# Patient Record
Sex: FEMALE | Race: BLACK OR AFRICAN AMERICAN | Employment: PART TIME | URBAN - METROPOLITAN AREA
[De-identification: names, ages, dates, MRNs, and addresses within clinical notes are randomized per-mention and may not be internally consistent; named-entity substitution may affect disease eponyms.]

---

## 2019-05-20 PROBLEM — F32.A DEPRESSIVE DISORDER: Status: ACTIVE | Noted: 2019-05-20

## 2019-05-21 PROBLEM — F31.64 BIPOLAR AFFECTIVE DISORDER, MIXED, SEVERE, WITH PSYCHOTIC BEHAVIOR (HCC): Status: ACTIVE | Noted: 2019-05-20

## 2019-05-30 PROBLEM — F31.2 SEVERE MANIC BIPOLAR I DISORDER WITH PSYCHOTIC FEATURES (HCC): Status: ACTIVE | Noted: 2019-05-20

## 2020-07-31 ENCOUNTER — OFFICE VISIT (OUTPATIENT)
Dept: NEUROLOGY | Age: 30
End: 2020-07-31

## 2020-07-31 VITALS
BODY MASS INDEX: 39.88 KG/M2 | DIASTOLIC BLOOD PRESSURE: 84 MMHG | HEART RATE: 84 BPM | WEIGHT: 190 LBS | SYSTOLIC BLOOD PRESSURE: 120 MMHG | RESPIRATION RATE: 18 BRPM | HEIGHT: 58 IN | OXYGEN SATURATION: 98 % | TEMPERATURE: 97.6 F

## 2020-07-31 DIAGNOSIS — R56.9 SEIZURES (HCC): Primary | ICD-10-CM

## 2020-07-31 NOTE — PATIENT INSTRUCTIONS
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
## 2020-08-14 ENCOUNTER — HOSPITAL ENCOUNTER (OUTPATIENT)
Dept: MRI IMAGING | Age: 30
Discharge: HOME | End: 2020-08-14
Attending: PSYCHIATRY & NEUROLOGY
Payer: COMMERCIAL

## 2020-08-14 ENCOUNTER — HOSPITAL ENCOUNTER (OUTPATIENT)
Dept: NEUROLOGY | Age: 30
Discharge: HOME | End: 2020-08-14
Attending: PSYCHIATRY & NEUROLOGY
Payer: COMMERCIAL

## 2020-08-14 DIAGNOSIS — R56.9 SEIZURES (HCC): ICD-10-CM

## 2020-08-14 PROCEDURE — 74011250636 HC RX REV CODE- 250/636: Performed by: PSYCHIATRY & NEUROLOGY

## 2020-08-14 PROCEDURE — 95816 EEG AWAKE AND DROWSY: CPT

## 2020-08-14 PROCEDURE — A9575 INJ GADOTERATE MEGLUMI 0.1ML: HCPCS | Performed by: PSYCHIATRY & NEUROLOGY

## 2020-08-14 PROCEDURE — 70553 MRI BRAIN STEM W/O & W/DYE: CPT

## 2020-08-14 RX ORDER — GADOTERATE MEGLUMINE 376.9 MG/ML
18 INJECTION INTRAVENOUS
Status: COMPLETED | OUTPATIENT
Start: 2020-08-14 | End: 2020-08-14

## 2020-08-14 RX ADMIN — GADOTERATE MEGLUMINE 18 ML: 376.9 INJECTION INTRAVENOUS at 12:09

## 2020-08-26 NOTE — PROGRESS NOTES
MRI of the brain showed no evidence of a stroke, tumor or bleed. It also did not show any evidence of abnormalities that may cause a seizure. Can you please let patient know.

## 2020-11-10 ENCOUNTER — OFFICE VISIT (OUTPATIENT)
Dept: NEUROLOGY | Age: 30
End: 2020-11-10
Payer: COMMERCIAL

## 2020-11-10 VITALS
SYSTOLIC BLOOD PRESSURE: 120 MMHG | WEIGHT: 205 LBS | TEMPERATURE: 97.7 F | DIASTOLIC BLOOD PRESSURE: 84 MMHG | OXYGEN SATURATION: 98 % | RESPIRATION RATE: 18 BRPM | BODY MASS INDEX: 43.03 KG/M2 | HEIGHT: 58 IN | HEART RATE: 76 BPM

## 2020-11-10 DIAGNOSIS — R68.89 SPELLS OF DECREASED ATTENTIVENESS: Primary | ICD-10-CM

## 2020-11-10 PROCEDURE — 99214 OFFICE O/P EST MOD 30 MIN: CPT | Performed by: PSYCHIATRY & NEUROLOGY

## 2020-11-10 RX ORDER — MELATONIN
1000 DAILY
COMMUNITY
Start: 2020-09-24 | End: 2021-07-29
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
## 2020-11-10 NOTE — PATIENT INSTRUCTIONS

## 2020-12-15 ENCOUNTER — TELEPHONE (OUTPATIENT)
Dept: NEUROLOGY | Age: 30
End: 2020-12-15

## 2020-12-15 NOTE — TELEPHONE ENCOUNTER
----- Message from [facility] sent at 12/14/2020 12:11 PM EST -----  Regarding: Dr. Leela Ding Message/Vendor Calls    Caller's first and last name:  N/A      Reason for call:  Medical records      Callback required yes/no and why: Y      Best contact number(s):  [phone]      Details to clarify the request:  Patient is requesting that her medical records be faxed over to [facility]. Patient signed a medical release form previously. She doesn't have the fax number for the counseling center, but she is asking that the office call and get the fax number. They can be reached at [phone]. Patient is requesting a call back to confirm that this has been done.       [facility]

## 2020-12-21 ENCOUNTER — TELEPHONE (OUTPATIENT)
Dept: NEUROLOGY | Age: 30
End: 2020-12-21

## 2020-12-21 NOTE — TELEPHONE ENCOUNTER
----- Message from Yahir Higgins sent at 12/18/2020  3:58 PM EST -----  Regarding: Dr Carolyn Frias first and last name:      Reason for call:pt said she is calling back to provide a fax number for the [address] for paper work for proof that she does not have seizures it is (p) [phone] attn:Nurse Nicki Frey required yes/no and why:yes to confirm it was faxed       Best contact number(s):[phone]      Details to clarify the request:      Yahir Higgins

## 2021-07-23 ENCOUNTER — HOSPITAL ENCOUNTER (INPATIENT)
Age: 31
LOS: 6 days | Discharge: HOME | DRG: 753 | End: 2021-07-29
Attending: EMERGENCY MEDICINE | Admitting: PSYCHIATRY & NEUROLOGY
Payer: COMMERCIAL

## 2021-07-23 DIAGNOSIS — F23 ACUTE PSYCHOSIS (HCC): Primary | ICD-10-CM

## 2021-07-23 PROBLEM — F29 PSYCHOSIS (HCC): Status: ACTIVE | Noted: 2021-07-23

## 2021-07-23 LAB
ALBUMIN SERPL-MCNC: 3.6 G/DL (ref 3.5–5)
ALBUMIN/GLOB SERPL: 0.8 {RATIO} (ref 1.1–2.2)
ALP SERPL-CCNC: 105 U/L (ref 45–117)
ALT SERPL-CCNC: 29 U/L (ref 12–78)
AMPHET UR QL SCN: NEGATIVE
ANION GAP SERPL CALC-SCNC: 10 MMOL/L (ref 5–15)
APAP SERPL-MCNC: <2 UG/ML (ref 10–30)
APPEARANCE UR: CLEAR
AST SERPL-CCNC: 22 U/L (ref 15–37)
BACTERIA URNS QL MICRO: ABNORMAL /HPF
BARBITURATES UR QL SCN: NEGATIVE
BASOPHILS # BLD: 0 K/UL (ref 0–0.1)
BASOPHILS NFR BLD: 1 % (ref 0–1)
BENZODIAZ UR QL: NEGATIVE
BILIRUB SERPL-MCNC: 0.3 MG/DL (ref 0.2–1)
BILIRUB UR QL: NEGATIVE
BUN SERPL-MCNC: 7 MG/DL (ref 6–20)
BUN/CREAT SERPL: 9 (ref 12–20)
CALCIUM SERPL-MCNC: 8.8 MG/DL (ref 8.5–10.1)
CANNABINOIDS UR QL SCN: NEGATIVE
CHLORIDE SERPL-SCNC: 101 MMOL/L (ref 97–108)
CO2 SERPL-SCNC: 28 MMOL/L (ref 21–32)
COCAINE UR QL SCN: NEGATIVE
COLOR UR: ABNORMAL
CREAT SERPL-MCNC: 0.8 MG/DL (ref 0.55–1.02)
DIFFERENTIAL METHOD BLD: ABNORMAL
DRUG SCRN COMMENT,DRGCM: NORMAL
EOSINOPHIL # BLD: 0 K/UL (ref 0–0.4)
EOSINOPHIL NFR BLD: 1 % (ref 0–7)
EPITH CASTS URNS QL MICRO: ABNORMAL /LPF
ERYTHROCYTE [DISTWIDTH] IN BLOOD BY AUTOMATED COUNT: 13.4 % (ref 11.5–14.5)
EST. AVERAGE GLUCOSE BLD GHB EST-MCNC: 146 MG/DL
ETHANOL SERPL-MCNC: <10 MG/DL
FLUAV RNA SPEC QL NAA+PROBE: NOT DETECTED
FLUBV RNA SPEC QL NAA+PROBE: NOT DETECTED
GLOBULIN SER CALC-MCNC: 4.6 G/DL (ref 2–4)
GLUCOSE BLD STRIP.AUTO-MCNC: 130 MG/DL (ref 65–117)
GLUCOSE SERPL-MCNC: 98 MG/DL (ref 65–100)
GLUCOSE UR STRIP.AUTO-MCNC: NEGATIVE MG/DL
HBA1C MFR BLD: 6.7 % (ref 4–5.6)
HCG UR QL: NEGATIVE
HCT VFR BLD AUTO: 40.5 % (ref 35–47)
HGB BLD-MCNC: 12.9 G/DL (ref 11.5–16)
HGB UR QL STRIP: NEGATIVE
IMM GRANULOCYTES # BLD AUTO: 0 K/UL (ref 0–0.04)
IMM GRANULOCYTES NFR BLD AUTO: 0 % (ref 0–0.5)
KETONES UR QL STRIP.AUTO: 15 MG/DL
LEUKOCYTE ESTERASE UR QL STRIP.AUTO: NEGATIVE
LYMPHOCYTES # BLD: 1.3 K/UL (ref 0.8–3.5)
LYMPHOCYTES NFR BLD: 24 % (ref 12–49)
MCH RBC QN AUTO: 25.4 PG (ref 26–34)
MCHC RBC AUTO-ENTMCNC: 31.9 G/DL (ref 30–36.5)
MCV RBC AUTO: 79.7 FL (ref 80–99)
METHADONE UR QL: NEGATIVE
MONOCYTES # BLD: 0.4 K/UL (ref 0–1)
MONOCYTES NFR BLD: 8 % (ref 5–13)
NEUTS SEG # BLD: 3.9 K/UL (ref 1.8–8)
NEUTS SEG NFR BLD: 66 % (ref 32–75)
NITRITE UR QL STRIP.AUTO: NEGATIVE
NRBC # BLD: 0 K/UL (ref 0–0.01)
NRBC BLD-RTO: 0 PER 100 WBC
OPIATES UR QL: NEGATIVE
PCP UR QL: NEGATIVE
PH UR STRIP: 6.5 [PH] (ref 5–8)
PLATELET # BLD AUTO: 332 K/UL (ref 150–400)
PMV BLD AUTO: 9.4 FL (ref 8.9–12.9)
POTASSIUM SERPL-SCNC: 3.7 MMOL/L (ref 3.5–5.1)
PROT SERPL-MCNC: 8.2 G/DL (ref 6.4–8.2)
PROT UR STRIP-MCNC: NEGATIVE MG/DL
RBC # BLD AUTO: 5.08 M/UL (ref 3.8–5.2)
RBC #/AREA URNS HPF: ABNORMAL /HPF (ref 0–5)
SALICYLATES SERPL-MCNC: <1.7 MG/DL (ref 2.8–20)
SARS-COV-2, COV2: NOT DETECTED
SERVICE CMNT-IMP: ABNORMAL
SODIUM SERPL-SCNC: 139 MMOL/L (ref 136–145)
SP GR UR REFRACTOMETRY: 1.02 (ref 1–1.03)
TSH SERPL DL<=0.05 MIU/L-ACNC: 1.82 UIU/ML (ref 0.36–3.74)
UROBILINOGEN UR QL STRIP.AUTO: 1 EU/DL (ref 0.2–1)
WBC # BLD AUTO: 5.7 K/UL (ref 3.6–11)
WBC URNS QL MICRO: ABNORMAL /HPF (ref 0–4)

## 2021-07-23 PROCEDURE — 36415 COLL VENOUS BLD VENIPUNCTURE: CPT

## 2021-07-23 PROCEDURE — 74011250637 HC RX REV CODE- 250/637: Performed by: PSYCHIATRY & NEUROLOGY

## 2021-07-23 PROCEDURE — 85025 COMPLETE CBC W/AUTO DIFF WBC: CPT

## 2021-07-23 PROCEDURE — 99284 EMERGENCY DEPT VISIT MOD MDM: CPT

## 2021-07-23 PROCEDURE — 83036 HEMOGLOBIN GLYCOSYLATED A1C: CPT

## 2021-07-23 PROCEDURE — 80179 DRUG ASSAY SALICYLATE: CPT

## 2021-07-23 PROCEDURE — 81001 URINALYSIS AUTO W/SCOPE: CPT

## 2021-07-23 PROCEDURE — 80053 COMPREHEN METABOLIC PANEL: CPT

## 2021-07-23 PROCEDURE — 82077 ASSAY SPEC XCP UR&BREATH IA: CPT

## 2021-07-23 PROCEDURE — 87636 SARSCOV2 & INF A&B AMP PRB: CPT

## 2021-07-23 PROCEDURE — 82962 GLUCOSE BLOOD TEST: CPT

## 2021-07-23 PROCEDURE — 80143 DRUG ASSAY ACETAMINOPHEN: CPT

## 2021-07-23 PROCEDURE — 84443 ASSAY THYROID STIM HORMONE: CPT

## 2021-07-23 PROCEDURE — 81025 URINE PREGNANCY TEST: CPT

## 2021-07-23 PROCEDURE — 80307 DRUG TEST PRSMV CHEM ANLYZR: CPT

## 2021-07-23 PROCEDURE — 65220000003 HC RM SEMIPRIVATE PSYCH

## 2021-07-23 RX ORDER — MAG HYDROX/ALUMINUM HYD/SIMETH 200-200-20
30 SUSPENSION, ORAL (FINAL DOSE FORM) ORAL
Status: DISCONTINUED | OUTPATIENT
Start: 2021-07-23 | End: 2021-07-29 | Stop reason: HOSPADM

## 2021-07-23 RX ORDER — LORAZEPAM 1 MG/1
1 TABLET ORAL
Status: DISCONTINUED | OUTPATIENT
Start: 2021-07-23 | End: 2021-07-29 | Stop reason: HOSPADM

## 2021-07-23 RX ORDER — BUPROPION HYDROCHLORIDE 100 MG/1
TABLET, EXTENDED RELEASE ORAL
COMMUNITY
Start: 2021-06-08 | End: 2021-07-29

## 2021-07-23 RX ORDER — ACETAMINOPHEN 325 MG/1
650 TABLET ORAL
Status: DISCONTINUED | OUTPATIENT
Start: 2021-07-23 | End: 2021-07-29 | Stop reason: HOSPADM

## 2021-07-23 RX ORDER — ADHESIVE BANDAGE
30 BANDAGE TOPICAL DAILY PRN
Status: DISCONTINUED | OUTPATIENT
Start: 2021-07-23 | End: 2021-07-29 | Stop reason: HOSPADM

## 2021-07-23 RX ORDER — ALBUTEROL SULFATE 90 UG/1
2 AEROSOL, METERED RESPIRATORY (INHALATION)
COMMUNITY
Start: 2020-09-23

## 2021-07-23 RX ORDER — HYDROXYZINE PAMOATE 50 MG/1
50 CAPSULE ORAL
Status: DISCONTINUED | OUTPATIENT
Start: 2021-07-23 | End: 2021-07-29 | Stop reason: HOSPADM

## 2021-07-23 RX ORDER — LORAZEPAM 2 MG/ML
1 INJECTION INTRAMUSCULAR
Status: DISCONTINUED | OUTPATIENT
Start: 2021-07-23 | End: 2021-07-29 | Stop reason: HOSPADM

## 2021-07-23 RX ORDER — TRAZODONE HYDROCHLORIDE 50 MG/1
50 TABLET ORAL
Status: DISCONTINUED | OUTPATIENT
Start: 2021-07-23 | End: 2021-07-29 | Stop reason: HOSPADM

## 2021-07-23 RX ORDER — IBUPROFEN 400 MG/1
400 TABLET ORAL
Status: DISCONTINUED | OUTPATIENT
Start: 2021-07-23 | End: 2021-07-29 | Stop reason: HOSPADM

## 2021-07-23 RX ADMIN — QUETIAPINE FUMARATE 300 MG: 200 TABLET ORAL at 22:32

## 2021-07-23 NOTE — ED NOTES
GHAZALAT consult with Amara Logan at [facility] completed via televideo. Pt verbalizes understanding of current plan.
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
## 2021-07-23 NOTE — ED NOTES
Bedside shift change report given to Demetiro Hastings RN (oncoming nurse) by Austen Guevara RN (offgoing nurse). Report included the following information SBAR and ED Summary.

## 2021-07-23 NOTE — ED TRIAGE NOTES
Insomnia and uncontrollable shaking onset a few weeks ago after running out of Seroquel; requesting refill until appt with Camilla Keller provider in 1 week
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
## 2021-07-23 NOTE — ED NOTES
TRANSFER - OUT REPORT:    Verbal report given to [facility] RN(name) on [employer]  being transferred to [facility]) for routine progression of care       Report consisted of patients Situation, Background, Assessment and   Recommendations(SBAR). Information from the following report(s) SBAR, ED Summary and Recent Results was reviewed with the receiving nurse. Lines:       Opportunity for questions and clarification was provided.       Patient transported with:   Jovan Brand

## 2021-07-24 LAB
ATRIAL RATE: 92 BPM
CALCULATED P AXIS, ECG09: -11 DEGREES
CALCULATED R AXIS, ECG10: 50 DEGREES
CALCULATED T AXIS, ECG11: 4 DEGREES
CHOLEST SERPL-MCNC: 172 MG/DL
DIAGNOSIS, 93000: NORMAL
HDLC SERPL-MCNC: 65 MG/DL
HDLC SERPL: 2.6 {RATIO} (ref 0–5)
LDLC SERPL CALC-MCNC: 99.6 MG/DL (ref 0–100)
P-R INTERVAL, ECG05: 138 MS
Q-T INTERVAL, ECG07: 336 MS
QRS DURATION, ECG06: 82 MS
QTC CALCULATION (BEZET), ECG08: 415 MS
TRIGL SERPL-MCNC: 37 MG/DL (ref ?–150)
VENTRICULAR RATE, ECG03: 92 BPM
VLDLC SERPL CALC-MCNC: 7.4 MG/DL

## 2021-07-24 PROCEDURE — 99221 1ST HOSP IP/OBS SF/LOW 40: CPT | Performed by: PSYCHIATRY & NEUROLOGY

## 2021-07-24 PROCEDURE — 80061 LIPID PANEL: CPT

## 2021-07-24 PROCEDURE — 93005 ELECTROCARDIOGRAM TRACING: CPT

## 2021-07-24 PROCEDURE — 36415 COLL VENOUS BLD VENIPUNCTURE: CPT

## 2021-07-24 PROCEDURE — 74011250637 HC RX REV CODE- 250/637: Performed by: PSYCHIATRY & NEUROLOGY

## 2021-07-24 PROCEDURE — 65220000003 HC RM SEMIPRIVATE PSYCH

## 2021-07-24 RX ORDER — ALBUTEROL SULFATE 90 UG/1
2 AEROSOL, METERED RESPIRATORY (INHALATION)
Status: DISCONTINUED | OUTPATIENT
Start: 2021-07-24 | End: 2021-07-29 | Stop reason: HOSPADM

## 2021-07-24 RX ORDER — BUPROPION HYDROCHLORIDE 150 MG/1
150 TABLET, EXTENDED RELEASE ORAL DAILY
Status: DISCONTINUED | OUTPATIENT
Start: 2021-07-24 | End: 2021-07-29 | Stop reason: HOSPADM

## 2021-07-24 RX ADMIN — QUETIAPINE FUMARATE 300 MG: 200 TABLET ORAL at 09:58

## 2021-07-24 RX ADMIN — QUETIAPINE FUMARATE 300 MG: 200 TABLET ORAL at 17:46

## 2021-07-24 RX ADMIN — BUPROPION HYDROCHLORIDE 150 MG: 150 TABLET, EXTENDED RELEASE ORAL at 15:52
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
## 2021-07-24 NOTE — PROGRESS NOTES
Problem: Psychosis    Goal: *STG/LTG: Complies with medication therapy    Affect and mood labile. +AH. Medication compliant without issue. Assessed/monitored pt for s/s of thought disorder. Encouraged sharing of feelings. Monitored medication compliance and effectiveness. Offered encouragement, reassurance, and support.     Outcome: Progressing Towards Goal

## 2021-07-24 NOTE — BH NOTES
Admission Note:  Pt arrived on unit at [address] [facility] on a voluntary admission  from [facility] ED. Pt is alert and oriented x 3 and Ambulatory. Leta Umanzor  and Nursing Supervisor notified. Dr. Keaton Renee will be notified via [employer]. Pt denied having a license with the [address] Pt smokes is a non smoker. Pt nravaez snot drink alcohol. Treatment to focus on decreasing depression, increasing coping skills, decreasing psychosis,  Medication mgmt,  mgmt of anxiety, mgmt of asthma,  and group therapy. Pt placed fall and q 15 mins safety checks. Pt was provided a mask and received education including the benefits of wearing a mask while hospitalized. Pt encouraged to practice social distancing to ensure the safety of self, peers, and staff. Pt verbalized understanding.

## 2021-07-24 NOTE — BH NOTES
Affect blunted. Mood anxious. Took nap this am then ate well. Attended group therapy as passive participant. Sad. Denies SI/HI/VH/pain. Ate all of meals. Showered.

## 2021-07-24 NOTE — PROGRESS NOTES
Problem: Psychosis  Goal: *STG: Remains safe in hospital  Note: Affect blunted. Mood anxious. Took nap this am then ate well. Attended group therapy as passive participant. Sad. Denies SI/HI/VH/pain. Ate all of meals. Showered.

## 2021-07-24 NOTE — BH NOTES
Affect blunted. Mood anxious. Alert and oriented x 3. Denies SI/HI/AVH/pain and does not appear to be responding to internal stimuli. Changed clothes a few times this morning and was rushing around unit quickly, while not talking to others. Did puzzle quickly and ate 100%. Med compliant.

## 2021-07-24 NOTE — MASTER TREATMENT PLAN
[address] [facility] 60  Master Treatment Plan for Odella Colorado    Date Treatment Plan Initiated:07/23/2021    Treatment Plan Modalities:  Type of Modality Amount  (x minutes) Frequency (x/week) Duration (x days) Name of Responsible Staff   [facility] meetings to encourage peer interactions 30 14 7 Teja Duran., CNA/MHT  Trung Collado., MHT   Group psychotherapy to assist in building coping skills and internal controls 60 5 5 El Graham., [facility]  Naveen Maldonado., [facility]   Therapeutic activity groups to build coping skills 61 7 7 aMrino Bolivar., MHT     Psychoeducation in group setting to address:   Medication education  Coping Skills  Symptom Management   60 7 7 El Graham., [facility]  Naveen Maldonado., [facility]  Sd Rosado., RN  Eva Dooley RN   Discharge planning   60 2 2 Juan Carlos Baez.,    Spirituality    60 2 2 Jaskaran Fernandez.   Physician medication management   15 7 7 MD HODA Chowdary MD                                       Treatment Team Signatures    I have participated in the development of this plan of treatment and agree to its implementation.     Patient Signature       Patient Printed Name Date/Time   Social Work/Therapist Signature       Social Work/Therapist Printed Name Date/Time   RN Signature       RN Printed Name      Sharita Roa RN Date/Time      07/23/21/2028   Other Signature     Other Signature Date/Time   MD Signature       MD Printed Name Date/Time

## 2021-07-24 NOTE — H&P
CC: \"I THOUGHT IT WAS A DIFFERENT DAY. \"    HPI:  PT SEEN AT [facility] IN 2019. PRESENTED TO OUR ER.     HERE IS BSMART NOTE FROM ER:  Patient is a 34-year old female with diagnoses, by record and self-report, of Bipolar Disorder, ADHD. She is an established patient at [address] ([facility]), again by record and self-report, with , Anyi Tsai. Records indicate a history of inpatient treatment ([facility]) for psychosis, km.      Today, patient reports that she ran out of Wellbutrin 7/9/21, resulting in a marked increase in auditory hallucinations and an inability to sleep that has disturbed her mood and functioning. Patient states that she called 911 today because she was scared. She told RN that she had lost track of how much of her Wellbutrin she's been taking, and that she's \"losing days. \"  RN reports that patient was certain that today is Tuesday, and became upset upon learning that it is Friday. Her chief complaint was reported as \"insomnia and uncontrollable shaking. \"     Patient stated that she'd been unable to reach [facility] because of phone problems (she states that she lives alone and currently has no family support). She now has an appointment for 7/30/21, but is highly anxious because she doesn't know if she can \"make it that long. \"  She denied SI/HI, but appears to be struggling to cope with her situation, requesting admission.     In current interview, patient appeared restless, unable to sit still. While nominally responsive, speech was almost pressured, and she had to be redirected to answer questions, often talking over writer as if unable to stop talking. She stated that voices are \"yelling at me inside my head\" whenever she stops talking. She reports taking Melatonin to help her sleep, but that it's been ineffective.   She denied SA.      Inpatient level of treatment is recommended to stabilize mood and because patient's level of confusion might lead to inability to care for herself, especially as regards inability to track chronological time and medication intake. Patient will be unable to address her medication through [facility] at least until 7/26/21, realistically longer. END OF BSMART NOTE        SUBSTANCE USE:  NONE          Past Psychiatric History:  [facility]  IP AT [address] AT AGE 5    Past Medical History:  HTN  HYPOTHYROIDISM    Current Medications:  NONE FOR PAST 2 WEEKS, WHICH WAS LEADING TO ADMISSION    Allergies:  NKDA    Social History:  She is single and has never been . She has no children. She lives with her mother and stepfather and apparently a brother and a brother's girlfriend. Educationally, she graduated from high school and has had 2 years of college, but she has been on disability for a number of years, receiving SSI. She does not smoke. Family Psychiatric History: There is extensive history of bipolar disorder in her grandmother, her brother, and another brother has ADHD and uncle has ADHD. She has one full brother and apparently several half siblings, who she has no contact with. Objective:     Vitals: stable and within normal limits  Labs:  CBC GROSSLY WNL, UA WNL, BMP WNL, HA1C 6.7, UPT NEGATIVE, UDS NEGATIVE, TSH 1.82, FLP WNL    ROS: PLEASE SEE INTERNISTS EVALUATION  PHYSICAL EXAM: PLEASE SEE INTERNISTS REPORT    Mental Status Exam:  Appearance: CASUALLY DRESSED, FAIRLY GROOMED  Behavior: COOPERATIVE, POOR EYE CONTACT  Motor: no psychomotor agitation or retardation noted  Speech: MUMBLED  Mood: A LITTLER BETTER\"  Affect: BLUNTED  Thought Content: NO SI/HI, PARANOIA NOTED, SOME CONFUSION  Thought Process: DERAILMENT OF THOUGHT  Orientation: A&O X 3  Insight/Judgment: LIMITED X 2    Assessment:  AXIS I:  1. Bipolar disorder, predominately manic, with psychosis (F31.2). AXIS II:  Deferred. AXIS III:  Hyperlipidemia; hypothyroidism. AXIS IV:  Stressors are mild.   AXIS V:  Global Assessment of Functioning currently is 35. Plan:    Precautions: THE PT WAS ADMITTED TO THE INPATIENT UNIT AND PLACED ON APPROPRIATE PRECAUTIONS. Labs: EKG  Meds: RESTART SEROQUEL 300MG BID AND WELLBUTRIN SR 150MG QDAY   BBW and side effects of all meds discussed and pt consented to treatment  Dispo: HILARIO SOLORZANO    Anticipated length of stay: 5-7 DAYS      1. I certify that the patient needs 24 hours of medical supervision to improve the above conditions. 2. The current mental illness is classified as severe. 3. Outpatient services only are insufficient currently to treat this patient's current psychiatric condition. 4. There is continued need for psychiatric inpatient treatment to address the above condition. 5. I certify that current psychiatric treatment could be reasonably expected to improve the patient's condition. 6. I certify that the patient should expect to make improvements as a result of inpatient psychiatric services  7. The risks and benefits of treatment were discussed with the patient.

## 2021-07-24 NOTE — BH NOTES
Affect and mood labile. Denied SI/HI/VH/pain. +AH. Anxious. Pt received one time dose of Seroquel 300 mg PO at 2232. Ate snacks. Stayed in dayroom putting together puzzle. Showered and changed clothing. Medication compliance without incident. Co-operative. Pt rested quietly in bed with eyes closed for 6.25 hours. Respirations even and unlabored. Pt in no apparent distress.

## 2021-07-24 NOTE — GROUP NOTE
GUILLERMO  GROUP DOCUMENTATION INDIVIDUAL                                                                          Group Therapy Note    Date: 7/24/2021    Group Start Time: 1430  Group End Time: 1500  Group Topic: Community Meeting    [address] [place] [address] GROUP DOCUMENTATION GROUP    Group Therapy Note    Attendees: 6         Attendance: Attended    Patient's Goal:  Get medication fixed    Interventions/techniques: Supported    Follows Directions: Followed directions    Interactions: Interacted appropriately    Mental Status: Calm, Labile and Preoccupied    Behavior/appearance: Cooperative    Goals Achieved: Able to engage in interactions, Able to listen to others and Able to give feedback to another      Additional Notes:  Roberta Lazaro was out for the Group Meeting today. She stated that she is having an okay day, ate good, and slept good last night. She is having no anxiety nor depression. A good thing that has happened today is that she got some sleep but nothing bad has happened. She has no thoughts of hurting herself nor anyone else and is in no pain at this time.     Mariana Flores CNA

## 2021-07-24 NOTE — ROUTINE PROCESS
Shift change report given to ELIZABETH Rothman RN, re: SBAR, medications, and behavior. Ashley Score - 1.

## 2021-07-25 PROCEDURE — 99231 SBSQ HOSP IP/OBS SF/LOW 25: CPT | Performed by: PSYCHIATRY & NEUROLOGY

## 2021-07-25 PROCEDURE — 74011250637 HC RX REV CODE- 250/637: Performed by: PSYCHIATRY & NEUROLOGY

## 2021-07-25 PROCEDURE — 65220000003 HC RM SEMIPRIVATE PSYCH

## 2021-07-25 RX ADMIN — QUETIAPINE FUMARATE 300 MG: 200 TABLET ORAL at 21:12

## 2021-07-25 RX ADMIN — QUETIAPINE FUMARATE 300 MG: 200 TABLET ORAL at 09:17

## 2021-07-25 RX ADMIN — BUPROPION HYDROCHLORIDE 150 MG: 150 TABLET, EXTENDED RELEASE ORAL at 09:17

## 2021-07-25 NOTE — BH NOTES
Affect constricted, mood depressed. Attended and participated in group. Ate snacks. No interaction with peers. Interacts with staff with prompting. Stayed in dayroom for the entire evening coloring artwork. Denied SI/HI/AVH/pain. Preoccupied. No scheduled or PRN medications given or requested. Pleasant and co-operative. Pt rested quietly in bed with eyes closed for 7 hours. Respirations even and unlabored.  Pt in no apparent distress

## 2021-07-25 NOTE — GROUP NOTE
GUILLERMO  GROUP DOCUMENTATION INDIVIDUAL                                                                          Group Therapy Note    Date: 7/25/2021    Group Start Time: 1860  Group End Time: 6727  Group Topic: Aubrey Espino    [address] GROUP DOCUMENTATION GROUP    Group Therapy Note    Attendees: 6         Attendance: Attended    Patient's Goal:  Get medication straight    Interventions/techniques: Supported    Follows Directions: Followed directions    Interactions: Interacted appropriately    Mental Status: Flat and Preoccupied    Behavior/appearance: Cooperative and Withdrawn/quiet    Goals Achieved: Able to engage in interactions, Able to listen to others and Able to give feedback to another      Additional Notes:  Vida Salguero was out for the Group Meeting today. She stated that she is having a good day, ate good, and slept good. She is having no anxiety nor depression. Nothing good nor bad has happened so far today. She has no thoughts of hurting herself nor anyone else and is having no pain.     Jeison Villar, PHOEBEA

## 2021-07-25 NOTE — GROUP NOTE
GUILLERMO  GROUP DOCUMENTATION INDIVIDUAL                                                                          Group Therapy Note    Date: 7/24/2021    Group Start Time: 2000  Group End Time: 2030  Group Topic: Community Meeting    [address] GROUP DOCUMENTATION GROUP    Group Therapy Note    Attendees: 5         Attendance: Attended    Patient's Goal:  Get medication fixed . Interventions/techniques: Provide feedback    Follows Directions: Followed directions    Interactions: Interacted appropriately    Mental Status: Calm and Preoccupied    Behavior/appearance: Cooperative, Needed prompting and Withdrawn/quiet    Goals Achieved: Able to listen to others      Additional Notes:  Patient said she had a good day . Patient said she has no pain , no anxiety ,  no depression , and no SI .      [facility]

## 2021-07-25 NOTE — PROGRESS NOTES
Problem: Psychosis  Goal: *STG/LTG: Complies with medication therapy  Outcome: Progressing Towards Goal  Note: Affect blunted. Mood anxious. Alert and oriented x 3. Denies SI/HI/AVH/pain and does not appear to be responding to internal stimuli. Changed clothes a few times this morning and was rushing around unit quickly, while not talking to others. Did puzzle quickly and ate 100%. Med compliant.

## 2021-07-25 NOTE — PROGRESS NOTES
SUBJECTIVE:   SLEPT 7 HOURS. NO SI/HI/AVH. NO SIDE EFFECTS OF MEDICATIONS NOTED. \"IM FINE TODAY. \"    Vitals: stable and within normal limits  Labs:  CBC GROSSLY WNL, UA WNL, BMP WNL, HA1C 6.7, UPT NEGATIVE, UDS NEGATIVE, TSH 1.82, FLP WNL,         Mental Status Exam:  Appearance: CASUALLY DRESSED, FAIRLY GROOMED  Behavior: COOPERATIVE, POOR EYE CONTACT  Motor: no psychomotor agitation or retardation noted  Speech: MUMBLED  Mood: A LITTLER BETTER\"  Affect: BLUNTED  Thought Content: NO SI/HI, PARANOIA NOTED,   Thought Process: MORE LINEAR AND GOAL ORIENTED  Orientation: A&O X 4  Insight/Judgment: FAIR X 2     Assessment:  AXIS I:  1.  Bipolar disorder, predominately manic, with psychosis (F31.2). Plan:   CONT CURRENT TREATMENT PLAN    REASON FOR CONTINUED STAY: MED NADYA, DC PLANNING            1. I certify that the patient needs 24 hours of medical supervision to improve the above conditions. 2. The current mental illness is classified as severe. 3. Outpatient services only are insufficient currently to treat this patient's current psychiatric condition. 4. There is continued need for psychiatric inpatient treatment to address the above condition. 5. I certify that current psychiatric treatment could be reasonably expected to improve the patient's condition. 6. I certify that the patient should expect to make improvements as a result of inpatient psychiatric services  7. The risks and benefits of treatment were discussed with the patient.

## 2021-07-25 NOTE — PROGRESS NOTES
Problem: Psychosis    Goal: *STG: Participates in individual and group therapy    Affect flat, mood depressed/anxious. Attended and participated in group. Denied AVH. No delusional statements made. Preoccupied. Assessed/monitored pt for s/s of thought disorder. Encouraged sharing of feelings. Monitored medication compliance and effectiveness. Encouraged focus on reality. Provided encouragement, reassurance, and support.     Outcome: Progressing Towards Goal

## 2021-07-26 PROCEDURE — 74011250637 HC RX REV CODE- 250/637: Performed by: PSYCHIATRY & NEUROLOGY

## 2021-07-26 PROCEDURE — 65220000003 HC RM SEMIPRIVATE PSYCH

## 2021-07-26 PROCEDURE — 99232 SBSQ HOSP IP/OBS MODERATE 35: CPT | Performed by: PSYCHIATRY & NEUROLOGY

## 2021-07-26 RX ADMIN — BUPROPION HYDROCHLORIDE 150 MG: 150 TABLET, EXTENDED RELEASE ORAL at 08:40

## 2021-07-26 RX ADMIN — QUETIAPINE FUMARATE 300 MG: 200 TABLET ORAL at 08:40

## 2021-07-26 RX ADMIN — QUETIAPINE FUMARATE 300 MG: 200 TABLET ORAL at 21:15

## 2021-07-26 NOTE — BH NOTES
Affect blunted/restricted, mood labile. Patient alert and oriented x 4. Patient speech pressured but clear and coherent. Patient answers questions appropriately. Patient denies SI/HI/AVH/pain. No delusional statements made. Patient having internal stimuli. Patient is compliant with medications. Patient eats 100% of all meals plus snacks. Patient attended and engaged in groups with prompting. Patient showered and changed clothes. Patient in no apparent distress all vital signs within normal limits.

## 2021-07-26 NOTE — GROUP NOTE
GUILLERMO  GROUP DOCUMENTATION INDIVIDUAL                                                                          Group Therapy Note    Date: 7/26/2021    Group Start Time: 0900  Group End Time: 7883  Group Topic: Process Group - Inpatient    [address] OP    Dana, [address] [facility] GROUP DOCUMENTATION GROUP    Group Therapy Note  Focus of session was based on Rational Rules for [facility] and how following these rules can help us cope and manage intense thoughts and feelings. We spoke about how we have thoughts without even knowing it and that some are rational and some thoughts are not. We spoke about the fact that we can change how we think but it takes work and practice. We spoke about simplest thoughts are but how powerful they can believe if we start acting like we believe them. We spoke about the following rules: It's nice to have peoples approval but even without it I can accept myself, Its best to focus on the positive, People are not always going to act the way that I would like them to, Things are the way they are right now and the way they should be, and I can control my thoughts, feelings, behavior, and body. We spoke about which skill group members would like to work to incorporate into their daily life right now. Attendance: Attended    Patient's Goal:      Patient will verbalize areas in need of boundary recognition and limit setting              Interventions/techniques: Challenged, Informed, Role playing and Supported    Follows Directions:  Followed directions    Interactions: Interacted appropriately    Mental Status: Calm, Congruent and Flat    Behavior/appearance: Attentive and Cooperative    Goals Achieved: Able to engage in interactions, Able to listen to others, Able to reflect/comment on own behavior, Able to receive feedback, Discussed coping and Identified medication adherence strategies      Additional Notes:  Pt participated in the group activity and engaged with others when prompted. She stated that she had returned to inpatient because she was not taking her medication consistently and wanted to get it regulated. She was quite throughout and worked on a puzzle.     Abdiaziz James

## 2021-07-26 NOTE — CERTIFICATE OF TERMINAL ILLNESS
Affect flat, mood depressed. Attended and participated in group. Ate snacks. No interaction with peers. Stayed in dayroom for entire evening so far, putting together a puzzle. Denied SI/HI/AVH/pain. Preoccupied. Medication compliant. Pleasant and co-operative. Pt rested quietly in bed with eyes closed for 6.75 hours. Respirations even and unlabored. Pt in no apparent distress.

## 2021-07-26 NOTE — BH NOTES
Shift change report given to [facility] re: SBAR, medications, and behavior.   Ashley fall risk score: 1

## 2021-07-26 NOTE — BH NOTES
Pt attended wrap-up group and was an active participant. Pt stated she has no pain, anxiety or depression, pt denies having suicidal ideations.

## 2021-07-26 NOTE — BH NOTES
Patient reports feeling better since getting back on her medication. She has been restless. She is wanting to to leave. She denies SI/HI/AVH. She spoke about why she could not live with her parents. Her mom did give her the number for her grandfather but she cannot get him to answer. She wants to go by his house first to talk to him, thinks he cannot hear the phone. She has been very quiet and soft spoken. Stays to herself and keeps herself busy. She is living in a transitional house and will return back to school in August. She participates in groups and is able to do her ADL's.

## 2021-07-26 NOTE — PROGRESS NOTES
Problem: Psychosis  Goal: *STG: Remains safe in hospital  Outcome: Progressing Towards Goal     Problem: Anxiety-Behavioral Health (Adult/Pediatric)  Goal: *STG: Remains safe in hospital  Outcome: Progressing Towards Goal  Goal: *STG/LTG: Complies with medication therapy  Outcome: Progressing Towards Goal

## 2021-07-26 NOTE — PROGRESS NOTES
Problem: Anxiety-Behavioral Health (Adult/Pediatric)  Goal: *STG: Remains safe in hospital  Outcome: Progressing Towards Goal  Goal: *STG: Attends activities and groups  Outcome: Progressing Towards Goal  Goal: *STG/LTG: Complies with medication therapy  Outcome: Progressing Towards Goal     Problem: Patient Education: Go to Patient Education Activity  Goal: Patient/Family Education  Outcome: Progressing Towards Goal

## 2021-07-26 NOTE — GROUP NOTE
GUILLERMO  GROUP DOCUMENTATION INDIVIDUAL                                                                          Group Therapy Note    Date: 7/26/2021    Group Start Time: 1030  Group End Time: 1100  Group Topic: Community Meeting    [address] [facility] [address] [facility] GROUP DOCUMENTATION GROUP    Group Therapy Note    Attendees: 5         Attendance: Attended    Patient's Goal:  Do things she need to get done. Interventions/techniques: Provide feedback    Follows Directions: Followed directions    Interactions: Interacted appropriately    Mental Status: Calm    Behavior/appearance: Cooperative    Goals Achieved: Able to listen to others      Additional Notes:  Patient stated that her appetite was good, no physical pain, no suicidal thoughts. Patient has no depression or anxiety.     Ryan Tomas

## 2021-07-26 NOTE — PROGRESS NOTES
INTERVAL Hx:  Records and clinical information were reviewed. States she's \"fine\" and that getting back on the Seroquel and Wellbutrin has already helped her. Pushing for D/C soon, but I explained the need to make sure the mixed/manic sx's are more stabilized. Still restless and somewhat pressured, but tries to cover those sx's. Denies having any AH's or other psychotic sx's now, but she's not reliable either. Tolerating the meds well, without any SE's noted or reported. Slept 6.75 hours last night. MEDS:  Current Facility-Administered Medications   Medication Dose Route Frequency    QUEtiapine (SEROquel) tablet 300 mg  300 mg Oral QAM    QUEtiapine (SEROquel) tablet 300 mg  300 mg Oral QHS    buPROPion SR (WELLBUTRIN SR) tablet 150 mg  150 mg Oral DAILY    albuterol (PROVENTIL HFA, VENTOLIN HFA, PROAIR HFA) (patient supplied) (Patient Supplied)  2 Puff Inhalation Q4H PRN    ziprasidone (GEODON) 10 mg in sterile water (preservative free) 0.5 mL injection  10 mg IntraMUSCular BID PRN    LORazepam (ATIVAN) injection 1 mg  1 mg IntraMUSCular Q6H PRN    LORazepam (ATIVAN) tablet 1 mg  1 mg Oral Q6H PRN    traZODone (DESYREL) tablet 50 mg  50 mg Oral QHS PRN    hydrOXYzine pamoate (VISTARIL) capsule 50 mg  50 mg Oral TID PRN    acetaminophen (TYLENOL) tablet 650 mg  650 mg Oral Q4H PRN    ibuprofen (MOTRIN) tablet 400 mg  400 mg Oral Q8H PRN    magnesium hydroxide (MILK OF MAGNESIA) 400 mg/5 mL oral suspension 30 mL  30 mL Oral DAILY PRN    alum-mag hydroxide-simeth (MYLANTA) oral suspension 30 mL  30 mL Oral Q4H PRN       VITALS:   Patient Vitals for the past 12 hrs:   Temp Pulse Resp BP SpO2   07/26/21 0732 97.9 °F (36.6 °C) 86 16 (!) 106/56 97 %       LABS: .No results found for this or any previous visit (from the past 24 hour(s)).     MSE:   Appearance: casually dressed; adequately groomed  Behavior: restless but no agitation  Motor: pressured/restless  Mood/Affect: hypomanic and slightly labile  Thought Process: derails easily  Thought Content: denies delusions or any SI/HI  Perceptions: denies hallucinations  Insight/Judgment: poor    ASSESSMENT:   1. Bipolar disorder, manic, with psychosis (F31.2)    PLAN:  1. Continue the Seroquel at 300 mg BID. 2. Continue the Wellbutrin SR at 150 mg daily. 3. ELOS: 3-5 days.

## 2021-07-26 NOTE — BH NOTES
Patient feels an improvement since getting back on her medications. She endorses still feeling restless, she denies SI/HI/AVH or any other psychotic symptoms. She is quiet and to herself. She lives in an apartment and will be returning back there. She is connected with [facility] and is able to do her ADL's. She will likely be discharged this week in 3-5 days.

## 2021-07-26 NOTE — PROGRESS NOTES
Problem: Psychosis    Goal: *STG: Decreased hallucinations    Affect flat, mood depressed. Denied AVH. No delusional statements. +paranoia. No interaction with peers. Poor eye contact. Assessed/monitored pt for signs/symptoms of thought disorder. Encouraged sharing of feelings. Monitored medication compliance and effectiveness. Encouraged focus on reality. Monitored pt's daily level of functioning. Provided encouragement, reassurance, and support.     Outcome: Progressing Towards Goal

## 2021-07-26 NOTE — ROUTINE PROCESS
Shift change report given to Trent Flores Rn, re: SBAR. Medications, and behavior.   Ashley Fall Risk Score (1)

## 2021-07-27 PROCEDURE — 74011250637 HC RX REV CODE- 250/637: Performed by: PSYCHIATRY & NEUROLOGY

## 2021-07-27 PROCEDURE — 65220000003 HC RM SEMIPRIVATE PSYCH

## 2021-07-27 PROCEDURE — 99231 SBSQ HOSP IP/OBS SF/LOW 25: CPT | Performed by: PSYCHIATRY & NEUROLOGY

## 2021-07-27 RX ADMIN — QUETIAPINE FUMARATE 300 MG: 200 TABLET ORAL at 21:11

## 2021-07-27 RX ADMIN — BUPROPION HYDROCHLORIDE 150 MG: 150 TABLET, EXTENDED RELEASE ORAL at 08:29

## 2021-07-27 RX ADMIN — QUETIAPINE FUMARATE 300 MG: 200 TABLET ORAL at 08:29

## 2021-07-27 NOTE — BH NOTES
Shift change report given to [facility] [place] re: SBAR, medications, and behavior.   Ashley fall risk score: 1

## 2021-07-27 NOTE — ROUTINE PROCESS
Shift change report given to Anthony Barron Rn, re: SBAR. Medications, and behavior.   Ashley Fall Risk Score (1)

## 2021-07-27 NOTE — BH NOTES
Master Treatment Plan Review for Dane Bee    Date of Admission Date Treatment Plan Reviewed Anticipated Date of Discharge Legal Status    07/23/2021 07/27/2021  Voluntary     Treatment & Discharge Planning Changes    Modality or Intervention Changes 07/24/2021-Lipid Panel, EKG     Psychotropic Medication Changes 07/25/2021+ Seroquel 300 mg PO AM+PM   Discharge Planning or Target Date Changes ELOS: 3-5 days   New Issues        Treatment Team Signatures    I have participated in the development of this plan of treatment and agree to its implementation.     Patient Signature       Patient Printed Name Date/Time   /Therapist Signature       /Therapist Printed Name Date/Time   RN Signature       RN Printed Name  Sheila Chaney RN Date/Time  07/27/2021  @1654   Unit  Signature       Unit  Printed Name Date/Time   MD Signature       MD Printed Name Date/Time

## 2021-07-27 NOTE — BH NOTES
Behavioral Health Interdisciplinary Rounds     Patient Name: Kevin Krishnamurthy  Age: 27 y.o. Room/Bed:  228/02  Primary Diagnosis: Severe manic bipolar I disorder with psychotic features ([facility] [address]   Admission Status: Voluntary     Readmission within 30 days: no  Power of  in place: no  Patient requires a blocked bed: no          Reason for blocked bed:     VTE Prophylaxis: Not indicated    Mobility needs/Fall risk: yes  Flu Vaccine : no   Nutritional Plan: no  Consults: no         Labs/Testing due today?: no    Sleep hours: 6.5       Participation in Care/Groups:  yes  Medication Compliant?: Yes  PRNS (last 24 hours): None    Restraints (last 24 hours):  no     CIWA (range last 24 hours):     COWS (range last 24 hours):      Alcohol screening (AUDIT) completed -   AUDIT Score: 0     If applicable, date SBIRT discussed in treatment team AND documented:   AUDIT Screen Score: AUDIT Score: 0      Document Brief Intervention (corresponds directly with the 5 A's, Ask, Advise, Assess, Assist, and Arrange): At- Risk Patients (Score 7-15 for women; 8-15 for men)  Discuss concern patient is drinking at unhealthy levels known to increase risk of alcohol-related health problems. Is Patient ready to commit to change? If No:   Encourage reflection   Discuss short term and long term health risks of consuming alcohol   Barriers to change   Reaffirm willingness to help / Educational materials provided  If Yes:   Set goal  Goko provided    Harmful use or Dependence (Score 16 or greater)   Discuss short term and long term health risks of consuming alcohol   Recommendations   Negotiate drinking goal   Recommend addiction specialist/center   Arrange follow-up appointments.     Tobacco - patient is a smoker: Have You Used Tobacco in the Past 30 Days: No  Illegal Drugs use: Have You Used Any Illegal Substances Over the Past 12 Months: No    24 hour chart check complete: yes Patient goal(s) for today: to stay positive  Treatment team focus/goals: remain safe in the hospital  Progress note patient reports feeling better after getting back on her medications, appears to be more stable, stays out of her room. DAMIÁN spoke with CM today    LOS:  4  Expected LOS: 2    Financial concerns/prescription coverage:  no  Family contact: no      Family requesting physician contact today:  no  Discharge plan: home  Access to weapons : no        Outpatient provider(s):  SHAM[facility]  Patient's preferred phone number for follow up call : [phone]    Participating treatment team members: Roby Douglas, * (assigned SW), Ann Bryant

## 2021-07-27 NOTE — GROUP NOTE
[facility] GROUP DOCUMENTATION INDIVIDUAL                                                                          Group Therapy Note    Date: 7/27/2021    Group Start Time: 1400  Group End Time: 1450  Group Topic: Process Group - Inpatient    [address] OP    [place], [address] [address]    IP [address] [facility] GROUP DOCUMENTATION GROUP    Group Therapy Note    Attendees: Focus of session was based on an article from the [place] on [address] [place]   We spoke about how we all face adversity and how there are strategies to help us survive it and even be stronger as a result. We discussed the basics of resilience and that it is the ability to face major challenges with skills and strategies rather than being overcome by the stressors. We spoke about the best strategies such as prioritize relationships and to have empathetic and supportive relationships in our lives and people who will encourage positive change. We spoke about fostering wellness and to find purpose in some way. We also spoke about the benefit of developing and embracing healthy thoughts. We spoke also about seeking help as needed if these skills are not helping. We discussed the strategies that can help group members right now to develop and implement. Attendance: Attended    Patient's Goal:       Patient will verbalize areas in need of boundary recognition and limit setting             Interventions/techniques: Informed, Validated and Supported    Follows Directions: Followed directions    Interactions: Disorganized interaction    Mental Status: Anxious and Preoccupied    Behavior/appearance: Attentive, Cooperative, Needed prompting, Poor eye contact and Withdrawn/quiet    Goals Achieved: Able to engage in interactions, Discussed coping and Identified relapse prevention strategies      Additional Notes:  Lisandra Woo participated in group with prompting.   She was minimal in her interaction but she would respond  to questions.       Lisbet Rhodes

## 2021-07-27 NOTE — PROGRESS NOTES
INTERVAL Hx:  Records and clinical information were reviewed. States she's feeling \"good\" and that the restlessness has also abated. Not as hyperactive or fidgety now, and seems to be more stable emotionally. Tolerating the Seroquel and Wellbutrin well and she denies having any SE's at all. Denies having any AH's or other psychotic sx's, and none are noted objectively. Discussed Tx and D/C plans further. Slept 6.5 hours last night. MEDS:  Current Facility-Administered Medications   Medication Dose Route Frequency    QUEtiapine (SEROquel) tablet 300 mg  300 mg Oral QAM    QUEtiapine (SEROquel) tablet 300 mg  300 mg Oral QHS    buPROPion SR (WELLBUTRIN SR) tablet 150 mg  150 mg Oral DAILY    albuterol (PROVENTIL HFA, VENTOLIN HFA, PROAIR HFA) (patient supplied) (Patient Supplied)  2 Puff Inhalation Q4H PRN    ziprasidone (GEODON) 10 mg in sterile water (preservative free) 0.5 mL injection  10 mg IntraMUSCular BID PRN    LORazepam (ATIVAN) injection 1 mg  1 mg IntraMUSCular Q6H PRN    LORazepam (ATIVAN) tablet 1 mg  1 mg Oral Q6H PRN    traZODone (DESYREL) tablet 50 mg  50 mg Oral QHS PRN    hydrOXYzine pamoate (VISTARIL) capsule 50 mg  50 mg Oral TID PRN    acetaminophen (TYLENOL) tablet 650 mg  650 mg Oral Q4H PRN    ibuprofen (MOTRIN) tablet 400 mg  400 mg Oral Q8H PRN    magnesium hydroxide (MILK OF MAGNESIA) 400 mg/5 mL oral suspension 30 mL  30 mL Oral DAILY PRN    alum-mag hydroxide-simeth (MYLANTA) oral suspension 30 mL  30 mL Oral Q4H PRN       VITALS:   Patient Vitals for the past 12 hrs:   Temp Pulse Resp BP SpO2   07/27/21 0807 98.3 °F (36.8 °C) 90 18 111/72 99 %       LABS: .No results found for this or any previous visit (from the past 24 hour(s)).     MSE:   Appearance: casually dressed; adequately groomed  Behavior: less restless; agitation  Motor: normal  Mood/Affect: less hypomanic and labile  Thought Process: derails easily  Thought Content: denies delusions or any SI/HI  Perceptions: denies hallucinations  Insight/Judgment: limited    ASSESSMENT:   1. Bipolar disorder, manic, with psychosis (F31.2)    PLAN:  1. Continue the Seroquel at 300 mg BID. 2. Continue the Wellbutrin SR at 150 mg daily. 3. ELOS: 2 days.

## 2021-07-27 NOTE — BH NOTES
Affect blunted/restricted, mood depressed. Patient alert and oriented x 4. Patient quiet and isolative with guarded behavior. Patient speech clear and coherent. Patient answer questions appropriately. Patient denies SI/HI/AVH/pain. No delusional statements made. Patient is compliant with medications and did not request any PRN's. Patient appetite good eats 100% of all meals and snacks. Patient attended and engaged in groups with prompting. Patient in no apparent distress all vital signs within normal limits.

## 2021-07-27 NOTE — GROUP NOTE
[facility]  GROUP DOCUMENTATION INDIVIDUAL                                                                          Group Therapy Note    Date: 7/26/2021    Group Start Time: 2000  Group End Time: 2030  Group Topic: Community Meeting    [address], Laly [place] CNA    IP [address]150 [facility] GROUP DOCUMENTATION GROUP    Group Therapy Note    Attendees: 3/6         Attendance: Attended    Patient's Goal:  To get discharged and make sure she continues to take her medication. Interventions/techniques: Supported    Follows Directions: Followed directions    Interactions: Interacted appropriately    Mental Status: Calm    Behavior/appearance: Cooperative    Goals Achieved: Able to engage in interactions      Additional Notes:  She has no anxiety, depression or pain at this time.     Tom Fields CNA

## 2021-07-27 NOTE — GROUP NOTE
[facility]  GROUP DOCUMENTATION INDIVIDUAL                                                                          Group Therapy Note    Date: 7/27/2021    Group Start Time: 1030  Group End Time: 1100  Group Topic: Community Meeting    [address] [facility] GROUP DOCUMENTATION GROUP    Group Therapy Note    Attendees: 5         Attendance: Attended    Patient's Goal:  To stay positive    Interventions/techniques: Provide feedback    Follows Directions: Followed directions    Interactions: Interacted appropriately    Mental Status: Flat    Behavior/appearance: Withdrawn/quiet    Goals Achieved: Able to listen to others      Additional Notes:  Patient appetite was good, no physical pain, no suicidal thoughts. Patient stated that she has no depression or anxiety.     Tabby Krueger

## 2021-07-27 NOTE — BH NOTES
Affect blunted, mood depressed. Pt attended and participated in scheduled group activities. Pt was social and appropriate with staff and peers. Pt denies SI/HI/AVH/Pain. Pt ate 100% of snack. Pt was compliant with medications and did not request a PRN. Pt is in no apparent distress at this time and made no delusional statements. Pt rested in her bed with her eyes closed for 6.5 hours.

## 2021-07-27 NOTE — PROGRESS NOTES
Problem: Psychosis  Goal: *STG: Remains safe in hospital  Outcome: Progressing Towards Goal     Problem: Psychosis  Goal: *STG/LTG: Complies with medication therapy  Outcome: Progressing Towards Goal     Problem: Psychosis  Goal: *STG: Seeks staff when feelings of self harm or harm towards others arise  Outcome: Resolved/Met     Problem: Psychosis  Goal: *STG: Participates in individual and group therapy  Outcome: Resolved/Met     Problem: Patient Education: Go to Patient Education Activity  Goal: Patient/Family Education  Outcome: Progressing Towards Goal

## 2021-07-27 NOTE — PROGRESS NOTES
Problem: Falls - Risk of  Goal: *Absence of Falls  Description: Document Jerry Yancey Fall Risk and appropriate interventions in the flowsheet.   Outcome: Progressing Towards Goal  Note: Fall Risk Interventions:            Medication Interventions: Teach patient to arise slowly                   Problem: Anxiety-Behavioral Health (Adult/Pediatric)  Goal: *STG: Remains safe in hospital  Outcome: Progressing Towards Goal  Goal: *STG: Attends activities and groups  Outcome: Progressing Towards Goal  Goal: *STG/LTG: Complies with medication therapy  Outcome: Progressing Towards Goal

## 2021-07-28 PROCEDURE — 74011250637 HC RX REV CODE- 250/637: Performed by: PSYCHIATRY & NEUROLOGY

## 2021-07-28 PROCEDURE — 99231 SBSQ HOSP IP/OBS SF/LOW 25: CPT | Performed by: PSYCHIATRY & NEUROLOGY

## 2021-07-28 PROCEDURE — 65220000003 HC RM SEMIPRIVATE PSYCH

## 2021-07-28 RX ADMIN — QUETIAPINE FUMARATE 300 MG: 200 TABLET ORAL at 21:49

## 2021-07-28 RX ADMIN — QUETIAPINE FUMARATE 300 MG: 200 TABLET ORAL at 09:39

## 2021-07-28 RX ADMIN — IBUPROFEN 400 MG: 400 TABLET, FILM COATED ORAL at 12:54

## 2021-07-28 RX ADMIN — BUPROPION HYDROCHLORIDE 150 MG: 150 TABLET, EXTENDED RELEASE ORAL at 09:39

## 2021-07-28 NOTE — GROUP NOTE
[facility]  GROUP DOCUMENTATION INDIVIDUAL                                                                          Group Therapy Note    Date: 7/28/2021    Group Start Time: 0900  Group End Time: 3629  Group Topic: Process Group - Inpatient    [address] [facility] GROUP DOCUMENTATION GROUP    Group Therapy Note    Focus of session was based on CRISTINA from [address] [place] [address] and how to manage codependent and/or unhealthy toxic relationships. We spoke about what those relationships look like and feel like in their lives right now and what they would like to change. Therapist reviewed that [address] stands for Justifying, Arguing, Defending, Explaining. We spoke about how and why we can fall into these habits with toxic/unhealthy people. Group members were asked to identify one behavior that they are willing to change and we spoke about how they can work on doing that. Attendance: Attended    Patient's Goal:      Develop safety plan for times when triggered in stressful situations              Interventions/techniques: Informed and Reinforced    Follows Directions: Followed directions    Interactions: Interacted appropriately    Mental Status: Calm and Congruent    Behavior/appearance: Cooperative, Needed prompting and Poor eye contact    Goals Achieved: Able to engage in interactions and Able to listen to others      Additional Notes:  Pt was in the group room today. She interacted on a limited basis with the other members of group. She acknowledged the activity but chose to keep working on her puzzle. She was polite but made limited eye contact.      Lucia Salinas

## 2021-07-28 NOTE — BH NOTES
Shift change report given to Marjorie Velazquez RN re: SBAR, medications, and behavior.   Ashley fall risk score 1

## 2021-07-28 NOTE — BH NOTES
Behavioral Health Interdisciplinary Rounds      Patient Name: George Craig                Age: 27 y.o.                  Room/Bed:  228/02  Primary Diagnosis: Severe manic bipolar [facility]           Admission Status: Voluntary                             Readmission within 30 days: no  Power of  in place: no  Patient requires a blocked bed: no          Reason for blocked bed:      VTE Prophylaxis: Not indicated     Mobility needs/Fall risk: yes  Flu Vaccine : no            Nutritional Plan: no  Consults: no                                                                  Labs/Testing due today?: no     Sleep hours: 5.5                                                            Participation in Care/Groups:  yes  Medication Compliant?: Yes  PRNS (last 24 hours): None                 Restraints (last 24 hours):  no                CIWA (range last 24 hours):     COWS (range last 24 hours):       Alcohol screening (AUDIT) completed -   AUDIT Score: 0     If applicable, date SBIRT discussed in treatment team AND documented:   AUDIT Screen Score: AUDIT Score: 0        Document Brief Intervention (corresponds directly with the 5 A's, Ask, Advise, Assess, Assist, and Arrange):       At- Risk Patients (Score 7-15 for women; 8-15 for men)  Discuss concern patient is drinking at unhealthy levels known to increase risk of alcohol-related health problems.     Is Patient ready to commit to change?      If No:  · Encourage reflection  · Discuss short term and long term health risks of consuming alcohol  · Barriers to change  · Reaffirm willingness to help / Educational materials provided  If Yes:  · Set goal  · Plan  · Educational materials provided     Harmful use or Dependence (Score 16 or greater)  · Discuss short term and long term health risks of consuming alcohol  · Recommendations  · Negotiate drinking goal  · Recommend addiction specialist/center  · Arrange follow-up appointments.     Tobacco - patient is a smoker: Have You Used Tobacco in the Past 30 Days: No  Illegal Drugs use: Have You Used Any Illegal Substances Over the Past 12 Months: No     24 hour chart check complete: yes      Patient goal(s) for today: to stay positive  Treatment team focus/goals: remain safe in the hospital  Progress note patient will be discharged tomorrow. Spoke with CM and obtained her follow up appointments with [address] [place] [address]  LOS:  4                        Expected LOS:1     Financial concerns/prescription coverage:  no  Family contact: no                                 Family requesting physician contact today:  no  Discharge plan: home  Access to weapons : no                                                            Outpatient provider(s):  [facility]  Patient's preferred phone number for follow up call : [phone]     Participating treatment team members: Katie Barnes, * (assigned SW), Rubia Acosta, [address]

## 2021-07-28 NOTE — BH NOTES
Affect blunted, mood depressed. Pt attended and participated in scheduled group activities. Pt was minimally social with staff and peers, but more isolative while in the dayroom. Pt denies SI/HI/AVH/pain. Pt ate 100% of snack. Pt was compliant with medications and did not request a PRN. Pt is in no apparent distress at this time and made no delusional statements. Pt rested in her bed with her eyes closed for 5.5 hours.

## 2021-07-28 NOTE — PROGRESS NOTES
Problem: Falls - Risk of  Goal: *Absence of Falls  Description: Document Gomez Keane Fall Risk and appropriate interventions in the flowsheet.   Outcome: Progressing Towards Goal  Note: Fall Risk Interventions:            Medication Interventions: Teach patient to arise slowly                   Problem: Psychosis  Goal: *STG: Remains safe in hospital  Outcome: Resolved/Met  Goal: *STG: Patient will verbalize areas in need of boundary recognition and limit setting  Outcome: Resolved/Met  Goal: *STG: Patient will develop strategies to regulate emotions and corresponding behaviors  Outcome: Resolved/Met  Goal: *STG: Accept constructive criticism without injury or isolation  Outcome: Resolved/Met  Goal: *STG: Develop safety plan for times when triggered in stressful situations  Outcome: Resolved/Met  Goal: *STG/LTG: Complies with medication therapy  Outcome: Resolved/Met  Goal: *STG/LTG: Demonstrates improved thought patterns as evidenced by logical and coherent speech  Outcome: Resolved/Met     Problem: Anxiety-Behavioral Health (Adult/Pediatric)  Goal: *STG: Participates in treatment plan  Outcome: Resolved/Met  Goal: *STG: Remains safe in hospital  Outcome: Resolved/Met  Goal: *STG: Attends activities and groups  Outcome: Resolved/Met  Goal: *STG: Demonstrates effective anxiety reduction strategies  Outcome: Resolved/Met  Goal: *STG/LTG: Complies with medication therapy  Outcome: Resolved/Met  Goal: Interventions  Outcome: Resolved/Met     Problem: Patient Education: Go to Patient Education Activity  Goal: Patient/Family Education  Outcome: Resolved/Met     Problem: Depressed Mood (Adult/Pediatric)  Goal: *STG: Participates in treatment plan  Outcome: Resolved/Met  Goal: *STG: Participates in 1:1 therapy sessions  Outcome: Resolved/Met  Goal: *STG: Verbalizes anger, guilt, and other feelings in a constructive manor  Outcome: Resolved/Met  Goal: *STG: Attends activities and groups  Outcome: Resolved/Met  Goal: *STG: Demonstrates reduction in symptoms and increase in insight into coping skills/future focused  Outcome: Resolved/Met  Goal: *STG: Remains safe in hospital  Outcome: Resolved/Met

## 2021-07-28 NOTE — PROGRESS NOTES
Problem: Falls - Risk of  Goal: *Absence of Falls  Description: Document Azucena Velasco Fall Risk and appropriate interventions in the flowsheet.   Outcome: Progressing Towards Goal  Note: Fall Risk Interventions:            Medication Interventions: Teach patient to arise slowly

## 2021-07-28 NOTE — BH NOTES
PSYCHOSOCIAL ASSESSMENT  :Patient identifying info:   Latina Schwab is a 27 y.o., female admitted 7/23/2021  2:13 PM     Presenting problem and precipitating factors: run out of her medications unable to get in touch with the [facility], having AH, poor sleep, poor mood functioning      Mental status assessment: fairly groomed, cooperative poor eye contact, no motor agitation or retardation, mumbled speech, affect blunted, mood fair, no SI,HI, derailment of thought, poor insight and judgment    Strengths: able to care for self, connected with outpatient services    Collateral information: CM    Current psychiatric /substance abuse providers and contact info: [facility]    Previous psychiatric/substance abuse providers and response to treatment: [facility]    Family history of mental illness or substance abuse:     Substance abuse history:  Extensive family history  Social History     Tobacco Use    Smoking status: Never Smoker    Smokeless tobacco: Never Used   Substance Use Topics    Alcohol use: No       History of biomedical complications associated with substance abuse : n/a    Patient's current acceptance of treatment or motivation for change: n/a    Family constellation: mother, stepfather, siblings    Is significant other involved?        Describe support system: family    Describe living arrangements and home environment: lives with family    Health issues:   Hospital Problems  Date Reviewed: 11/10/2020        Codes Class Noted POA    Psychosis ([facility] ICD-10-CM: F29  ICD-9-CM: 298.9  7/23/2021 Yes        * (Principal) Severe manic bipolar I disorder with psychotic features [facility]) ICD-10-CM: F31.2  ICD-9-CM: 296.44  5/20/2019 Yes        Asthma ICD-10-CM: J45.909  ICD-9-CM: 493.90  9/29/2009 Yes              Trauma history: denies    Legal issues: denies    History of  service: denies    Financial status: SSDI    Latter-day/cultural factors: none expressed    Education/work history: HS graduate, 2 years of college    Have you been licensed as a health care professional (current or ): no    Leisure and recreation preferences: color watch TV    Describe coping skills: take medicine, call CM if needed    Migue Candelario  2021

## 2021-07-28 NOTE — BH NOTES
Affect constricted, mood depressed/anxious. Alert and Oriented X 4. Cooperative and pleasant. Attended and participated in group. Interacted with staff and peers. Makes needs known. Ate all meals and snacks. Denied SI/HI/AVH and pain. Medication compliant. Stable with no s/s of distress.       PRN Medication Documentation    Specific patient behavior that led to need for PRN medication:c/o abd muscle pain  Staff interventions attempted prior to PRN being given: repositioned   PRN medication given: motrin 400 mg pp at [phone]  Patient response/effectiveness of PRN medication: tolerated

## 2021-07-28 NOTE — PROGRESS NOTES
INTERVAL Hx:  Records and clinical information were reviewed. States she's still feeling \"good\" and that she hasn't been nearly as restless or pressured as she was PTA. Objectively seems to be at or near her baseline--no evidence of mood instability, agitation, etc. Tolerating the Seroquel and Wellbutrin easilyl and she denies having any SE's at all. Denies having any AH's or other psychotic sx's, and none are noted objectively. Discussed Tx and D/C plans further. Slept 5.5 hours last night. MEDS:  Current Facility-Administered Medications   Medication Dose Route Frequency    QUEtiapine (SEROquel) tablet 300 mg  300 mg Oral QAM    QUEtiapine (SEROquel) tablet 300 mg  300 mg Oral QHS    buPROPion SR (WELLBUTRIN SR) tablet 150 mg  150 mg Oral DAILY    albuterol (PROVENTIL HFA, VENTOLIN HFA, PROAIR HFA) (patient supplied) (Patient Supplied)  2 Puff Inhalation Q4H PRN    ziprasidone (GEODON) 10 mg in sterile water (preservative free) 0.5 mL injection  10 mg IntraMUSCular BID PRN    LORazepam (ATIVAN) injection 1 mg  1 mg IntraMUSCular Q6H PRN    LORazepam (ATIVAN) tablet 1 mg  1 mg Oral Q6H PRN    traZODone (DESYREL) tablet 50 mg  50 mg Oral QHS PRN    hydrOXYzine pamoate (VISTARIL) capsule 50 mg  50 mg Oral TID PRN    acetaminophen (TYLENOL) tablet 650 mg  650 mg Oral Q4H PRN    ibuprofen (MOTRIN) tablet 400 mg  400 mg Oral Q8H PRN    magnesium hydroxide (MILK OF MAGNESIA) 400 mg/5 mL oral suspension 30 mL  30 mL Oral DAILY PRN    alum-mag hydroxide-simeth (MYLANTA) oral suspension 30 mL  30 mL Oral Q4H PRN       VITALS:   Patient Vitals for the past 12 hrs:   Temp Pulse Resp BP SpO2   07/28/21 0722 97.7 °F (36.5 °C) 93 16 107/62 98 %       LABS: .No results found for this or any previous visit (from the past 24 hour(s)).     MSE:   Appearance: casually dressed; adequately groomed  Behavior: less restless; agitation  Motor: normal  Mood/Affect: close to euthymic and stable  Thought Process: more focused  Thought Content: denies delusions or any SI/HI  Perceptions: denies hallucinations  Insight/Judgment: fair    ASSESSMENT:   1. Bipolar disorder, manic, with psychosis (F31.2)    PLAN:  1. Continue the Seroquel at 300 mg BID. 2. Continue the Wellbutrin SR at 150 mg daily. 3. Plan for D/C tomorrow if still stable--has appt with SHAMAR SOLORZANO on 7/30.

## 2021-07-28 NOTE — GROUP NOTE
GUILLERMO  GROUP DOCUMENTATION INDIVIDUAL                                                                          Group Therapy Note    Date: 7/27/2021    Group Start Time: 2000  Group End Time: 2045  Group Topic: Community Meeting    [address] [place] [address] GROUP DOCUMENTATION GROUP    Group Therapy Note    Attendees: 7         Attendance: Attended    Patient's Goal:  To stay positive    Interventions/techniques: Supported    Follows Directions: Followed directions    Interactions: Interacted appropriately    Mental Status: Flat    Behavior/appearance: Cooperative and Withdrawn/quiet    Goals Achieved: Able to engage in interactions, Able to listen to others and Able to give feedback to another      Additional Notes:  [facility] She stated that she is having a good day, ate good, and will sleep good tonight. She stated that she is having no anxiety nor depression. Nothing good nor bad has happened today. She has no thoughts of hurting herself nor anyone else and is in no pain.     Nivia Benson CNA

## 2021-07-28 NOTE — ROUTINE PROCESS
Shift change report given to Latha Hou Rn, re: SBAR. Medications, and behavior.   Ashley Fall Risk Score (1)

## 2021-07-29 VITALS
HEIGHT: 58 IN | DIASTOLIC BLOOD PRESSURE: 61 MMHG | WEIGHT: 208 LBS | OXYGEN SATURATION: 97 % | BODY MASS INDEX: 43.66 KG/M2 | HEART RATE: 95 BPM | RESPIRATION RATE: 17 BRPM | SYSTOLIC BLOOD PRESSURE: 107 MMHG | TEMPERATURE: 97 F

## 2021-07-29 PROCEDURE — 99239 HOSP IP/OBS DSCHRG MGMT >30: CPT | Performed by: PSYCHIATRY & NEUROLOGY

## 2021-07-29 PROCEDURE — 74011250637 HC RX REV CODE- 250/637: Performed by: PSYCHIATRY & NEUROLOGY

## 2021-07-29 RX ORDER — BUPROPION HYDROCHLORIDE 150 MG/1
150 TABLET ORAL DAILY
Qty: 30 TABLET | Refills: 0 | Status: SHIPPED | OUTPATIENT
Start: 2021-07-29

## 2021-07-29 RX ORDER — QUETIAPINE FUMARATE 300 MG/1
300 TABLET, FILM COATED ORAL 2 TIMES DAILY
Qty: 60 TABLET | Refills: 0 | Status: SHIPPED | OUTPATIENT
Start: 2021-07-29

## 2021-07-29 RX ADMIN — BUPROPION HYDROCHLORIDE 150 MG: 150 TABLET, EXTENDED RELEASE ORAL at 08:56

## 2021-07-29 RX ADMIN — QUETIAPINE FUMARATE 300 MG: 200 TABLET ORAL at 08:56

## 2021-07-29 NOTE — SUICIDE SAFETY PLAN
SAFETY PLAN    A suicide Safety Plan is a document that supports someone when they are having thoughts of suicide. Warning Signs that indicate a suicidal crisis may be developing: What (situations, thoughts, feelings, body sensations, behaviors, etc.) do you experience that lets you know you are beginning to think about suicide? 1. Head aches   2. Feel the need to escape  3. Internal Coping Strategies:  What things can I do (relaxation techniques, hobbies, physical activities, etc.) to take my mind off my problems without contacting another person? 1. Listen to music  2. Reading writing  3. drawing    People and social settings that provide distraction: Who can I call or where can I go to distract me? 1. Name:   Phone:   2. Name:   Phone:    3. Place:             4. Place:     People whom I can ask for help: Who can I call when I need help - for example, friends, family, clergy, someone else? 1. Name: Colton Moore  Phone:   2. Name:   Phone:   3. Name:   Phone:     Professionals or [address] [place] [address] I can contact during a crisis: Who can I call for help - for example, my doctor, my psychiatrist, my psychologist, a mental health provider, a suicide hotline? 1. Clinician Name:Althea Salmeron    Phone: [phone]      Clinician Pager or Emergency Contact #:     2. Clinician Name: Colton Moore   Phone: [phone]      Clinician Pager or Emergency Contact #:     3. Suicide Prevention Lifeline: 1-971-596-TALK (3764)    4. [address] [place] [address] Emergency Services -  for example, [facility], local county suicide [facility]line[facility]: [phone]      Emergency Services Address: [address] [place] [address]      Emergency Services Phone: [phone]    Making the environment safe: How can I make my environment (house/apartment/living space) safer? For example, can I remove guns, medications, and other items? 1. Pill box  2.

## 2021-07-29 NOTE — BH NOTES
Affect blunted, mood calm. Pt attended and participated in scheduled group activities. Pt denies SI/HI/AVH/pain. Although Pt in the dayroom all day she does not interact with staff or peers unless asked a question. Pt ate 100% of snack. Pt was compliant with medications and did not request a PRN. Pt is in no apparent distress at this time and made no delusional statements. Pt rested in her bed with her eyes closed for 6.5 hours.

## 2021-07-29 NOTE — GROUP NOTE
GUILLERMO  GROUP DOCUMENTATION INDIVIDUAL                                                                          Group Therapy Note    Date: 7/29/2021    Group Start Time: 1030  Group End Time: 2101  Group Topic: Community Meeting    [facility] GROUP DOCUMENTATION GROUP    Group Therapy Note    Attendees: 6         Attendance: Attended    Patient's Goal:  To get home safely     Interventions/techniques: Promoted peer support    Follows Directions:  Followed directions    Interactions: Interacted appropriately    Mental Status: Calm    Behavior/appearance: Attentive and Cooperative    Goals Achieved: Able to engage in interactions, Able to listen to others, Able to give feedback to another, Able to reflect/comment on own behavior, Able to manage/cope with feelings, Able to receive feedback, Able to experience relief/decrease in symptoms and Able to self-disclose      Additional Notes:  No complaints of pain, anxiety or depression, denies suicidal ideations     Heriberto Alves

## 2021-07-29 NOTE — BH NOTES
Patient will be discharged to home this date. Transportation will be provided by [employer]. She will speak to her  Anita Dumont by phone on 7/30 at 930am and then will speak by phone to her NP Genia Ying for medication management on 7/30 at 1030. BH transition of care was routed to the [facility] and to her her PCP, she was given a copy of this as well. The patient was involved and agreeable in her discharge plan.

## 2021-07-29 NOTE — BH NOTES
Discharge Note:    Patient discharged home today. Patient is alert and oriented x 4. Patient speech clear and coherent. Patient answers questions appropriately. Patient denies SI/HI/AVH/pain. No delusional statements made. Patient is compliant with medications and no PRN's requested. Patient appetite good ate 100% of breakfast and lunch. Patient attended and engaged in groups. No agitation or aggression noted. Patient in no apparent distress all vital signs in normal limits. RN reviewed discharge orders and instructions with patient which included medication drug name, purpose, doses, administration times, route, and adverse effects. Patient verbalized understanding and provided written copies of discharge orders and instructions. Dr. Debbie Gustafson sent orders into [address] [facility] in [place], [place]. Patient left the unit @ 1315 wearing a face mask. All personal  valuables and belongings along with home medication sent with patient.

## 2021-07-29 NOTE — GROUP NOTE
GUILLERMO  GROUP DOCUMENTATION INDIVIDUAL                                                                          Group Therapy Note    Date: 7/28/2021    Group Start Time: 2000  Group End Time: 2030  Group Topic: Community Meeting    [address] [place] [address] GROUP DOCUMENTATION GROUP    Group Therapy Note    Attendees: 4         Attendance: Attended    Patient's Goal:  none    Interventions/techniques: Supported    Follows Directions: Followed directions    Interactions: Interacted appropriately    Mental Status: Calm and Flat    Behavior/appearance: Cooperative and Withdrawn/quiet    Goals Achieved: Able to engage in interactions, Able to listen to others and Able to give feedback to another      Additional Notes:  Lisandra Woo was out for the Wrap-Up Group tonight. She stated that she is having a good day, ate good, and will sleep good tonight. She is having no anxiety nor depression. She had a good day putting puzzles together but nothing bad has happened. She has no thoughts of hurting herself nor anyone else and is in no pain at this time.     Morey Mohs, CNA

## 2021-07-29 NOTE — BH NOTES
Behavioral Health Transition Record to Provider    Patient Name: Ozzie Casanova  YOB: 1990  Medical Record Number: [id number]  Date of Admission: 7/23/2021  Date of Discharge: 7/29/21    Attending Provider: Davion Joy MD  Discharging Provider: João Host  To contact this individual call [phone] and ask the  to page. If unavailable, ask to be transferred to [address] [place] [address] Provider on call. [facility] Provider will be available on call 24/7 and during holidays. Primary Care Provider: Lux Gunderson MD    No Known Allergies    Reason for Admission: psychosis    Admission Diagnosis: Psychosis, unspecified [facility] [F29]    * No surgery found *    Results for orders placed or performed during the hospital encounter of 07/23/21   ETHYL ALCOHOL   Result Value Ref Range    ALCOHOL(ETHYL),SERUM <10 <10 MG/DL   ACETAMINOPHEN   Result Value Ref Range    Acetaminophen level <2 (L) 10 - 30 ug/mL   SALICYLATE   Result Value Ref Range    Salicylate level <4.1 (L) 2.8 - 20.0 MG/DL   CBC WITH AUTOMATED DIFF   Result Value Ref Range    WBC 5.7 3.6 - 11.0 K/uL    RBC 5.08 3.80 - 5.20 M/uL    HGB 12.9 11.5 - 16.0 g/dL    HCT 40.5 35.0 - 47.0 %    MCV 79.7 (L) 80.0 - 99.0 FL    MCH 25.4 (L) 26.0 - 34.0 PG    MCHC 31.9 30.0 - 36.5 g/dL    RDW 13.4 11.5 - 14.5 %    PLATELET 899 704 - 087 K/uL    MPV 9.4 8.9 - 12.9 FL    NRBC 0.0 0  WBC    ABSOLUTE NRBC 0.00 0.00 - 0.01 K/uL    NEUTROPHILS 66 32 - 75 %    LYMPHOCYTES 24 12 - 49 %    MONOCYTES 8 5 - 13 %    EOSINOPHILS 1 0 - 7 %    BASOPHILS 1 0 - 1 %    IMMATURE GRANULOCYTES 0 0.0 - 0.5 %    ABS. NEUTROPHILS 3.9 1.8 - 8.0 K/UL    ABS. LYMPHOCYTES 1.3 0.8 - 3.5 K/UL    ABS. MONOCYTES 0.4 0.0 - 1.0 K/UL    ABS. EOSINOPHILS 0.0 0.0 - 0.4 K/UL    ABS. BASOPHILS 0.0 0.0 - 0.1 K/UL    ABS. IMM.  GRANS. 0.0 0.00 - 0.04 K/UL    DF AUTOMATED     METABOLIC PANEL, COMPREHENSIVE   Result Value Ref Range    Sodium 139 136 - 145 mmol/L    Potassium 3.7 3.5 - 5.1 mmol/L    Chloride 101 97 - 108 mmol/L    CO2 28 21 - 32 mmol/L    Anion gap 10 5 - 15 mmol/L    Glucose 98 65 - 100 mg/dL    BUN 7 6 - 20 MG/DL    Creatinine 0.80 0.55 - 1.02 MG/DL    BUN/Creatinine ratio 9 (L) 12 - 20      GFR est AA >60 >60 ml/min/1.73m2    GFR est non-AA >60 >60 ml/min/1.73m2    Calcium 8.8 8.5 - 10.1 MG/DL    Bilirubin, total 0.3 0.2 - 1.0 MG/DL    ALT (SGPT) 29 12 - 78 U/L    AST (SGOT) 22 15 - 37 U/L    Alk.  phosphatase 105 45 - 117 U/L    Protein, total 8.2 6.4 - 8.2 g/dL    Albumin 3.6 3.5 - 5.0 g/dL    Globulin 4.6 (H) 2.0 - 4.0 g/dL    A-G Ratio 0.8 (L) 1.1 - 2.2     DRUG SCREEN, URINE   Result Value Ref Range    AMPHETAMINES Negative NEG      BARBITURATES Negative NEG      BENZODIAZEPINES Negative NEG      COCAINE Negative NEG      METHADONE Negative NEG      OPIATES Negative NEG      PCP(PHENCYCLIDINE) Negative NEG      THC (TH-CANNABINOL) Negative NEG      Drug screen comment (NOTE)    URINALYSIS W/MICROSCOPIC   Result Value Ref Range    Color YELLOW/STRAW      Appearance CLEAR CLEAR      Specific gravity 1.025 1.003 - 1.030      pH (UA) 6.5 5.0 - 8.0      Protein Negative NEG mg/dL    Glucose Negative NEG mg/dL    Ketone 15 (A) NEG mg/dL    Bilirubin Negative NEG      Blood Negative NEG      Urobilinogen 1.0 0.2 - 1.0 EU/dL    Nitrites Negative NEG      Leukocyte Esterase Negative NEG      WBC 0-4 0 - 4 /hpf    RBC 0-5 0 - 5 /hpf    Epithelial cells MODERATE (A) FEW /lpf    Bacteria 1+ (A) NEG /hpf   TSH 3RD GENERATION   Result Value Ref Range    TSH 1.82 0.36 - 3.74 uIU/mL   COVID-19 WITH INFLUENZA A/B   Result Value Ref Range    SARS-CoV-2 Not detected NOTD      Influenza A by PCR Not detected NOTD      Influenza B by PCR Not detected NOTD     HCG URINE, QL   Result Value Ref Range    HCG urine, QL Negative NEG     HEMOGLOBIN A1C WITH EAG   Result Value Ref Range    Hemoglobin A1c 6.7 (H) 4.0 - 5.6 %    Est. average glucose 146 mg/dL LIPID PANEL   Result Value Ref Range    Cholesterol, total 172 <200 MG/DL    Triglyceride 37 <150 MG/DL    HDL Cholesterol 65 MG/DL    LDL, calculated 99.6 0 - 100 MG/DL    VLDL, calculated 7.4 MG/DL    CHOL/HDL Ratio 2.6 0.0 - 5.0     GLUCOSE, POC   Result Value Ref Range    Glucose (POC) 130 (H) 65 - 117 mg/dL    Performed by Albert Aguayo (RN)    EKG, 12 LEAD, INITIAL   Result Value Ref Range    Ventricular Rate 92 BPM    Atrial Rate 92 BPM    P-R Interval 138 ms    QRS Duration 82 ms    Q-T Interval 336 ms    QTC Calculation (Bezet) 415 ms    Calculated P Axis -11 degrees    Calculated R Axis 50 degrees    Calculated T Axis 4 degrees    Diagnosis       Normal sinus rhythm  NSTWC  When compared with ECG of 23-MAY-2019 08:44,  No significant change was found  Confirmed by Billy Le MD, --- ([id number]) on 7/24/2021 10:41:38 PM         Immunizations administered during this encounter:   Immunization History   Administered Date(s) Administered    COVID-19, PFIZER, MRNA, LNP-S, PF, 30MCG/0.3ML DOSE 05/28/2021    Human Papillomavirus 03/01/2008, 10/23/2008, 04/28/2009    Tdap 08/13/2017       Screening for Metabolic Disorders for Patients on Antipsychotic Medications  (Data obtained from the EMR)    Estimated Body Mass Index  Estimated body mass index is 43.47 kg/m² as calculated from the following:    Height as of this encounter: 4' 10\" (1.473 m). Weight as of this encounter: 94.3 kg (208 lb).      Vital Signs/Blood Pressure  Visit Vitals  /61 (BP 1 Location: Right upper arm, BP Patient Position: Sitting)   Pulse 95   Temp 97 °F (36.1 °C)   Resp 17   Ht 4' 10\" (1.473 m)   Wt 94.3 kg (208 lb)   SpO2 97%   BMI 43.47 kg/m²       Blood Glucose/Hemoglobin A1c  Lab Results   Component Value Date/Time    Glucose 98 07/23/2021 03:20 PM    Glucose (POC) 130 (H) 07/23/2021 08:23 PM       Lab Results   Component Value Date/Time    Hemoglobin A1c 6.7 (H) 07/23/2021 03:20 PM        Lipid Panel  Lab Results Component Value Date/Time    Cholesterol, total 172 07/24/2021 07:16 AM    HDL Cholesterol 65 07/24/2021 07:16 AM    LDL, calculated 99.6 07/24/2021 07:16 AM    Triglyceride 37 07/24/2021 07:16 AM    CHOL/HDL Ratio 2.6 07/24/2021 07:16 AM        Discharge Diagnosis: Bipolar disorder, manic, with psychosis    Discharge Plan: Please keep all scheduled follow up appointments. If you are unable to keep your appointment with your physician or therapist, please call them at least 24 hours in advance, if possible, so another appointment can be scheduled. It is important that you maintain contact with your physician(s) after discharge. Discharge Medication List and Instructions:   Current Discharge Medication List      START taking these medications    Details   buPROPion XL (WELLBUTRIN XL) 150 mg tablet Take 1 Tablet by mouth daily. Qty: 30 Tablet, Refills: 0  Start date: 7/29/2021         CONTINUE these medications which have CHANGED    Details   QUEtiapine (SEROquel) 300 mg tablet Take 1 Tablet by mouth two (2) times a day. Qty: 60 Tablet, Refills: 0  Start date: 7/29/2021         CONTINUE these medications which have NOT CHANGED    Details   albuterol (PROVENTIL HFA, VENTOLIN HFA, PROAIR HFA) 90 mcg/actuation inhaler Take 2 Puffs by inhalation every four (4) hours as needed. STOP taking these medications       buPROPion SR (WELLBUTRIN SR) 100 mg SR tablet Comments:   Reason for Stopping:         cholecalciferol (VITAMIN D3) (1000 Units /25 mcg) tablet Comments:   Reason for Stopping:               Unresulted Labs (24h ago, onward)    None        To obtain results of studies pending at discharge, please contact     Follow-up Information     Follow up With Specialties Details Why Contact Info    Gilekemal Krueger, [address]   [address] [place] [address]  [phone]            Advanced Directive:   Does the patient have an appointed surrogate decision maker?  No  Does the patient have a Medical Advance Directive? No  Does the patient have a Psychiatric Advance Directive? No  If the patient does not have a surrogate or Medical Advance Directive AND Psychiatric Advance Directive, the patient was offered information on these advance directives Yes and Patient will complete at a later time    Patient Instructions: Please continue all medications until otherwise directed by physician. Tobacco Cessation Discharge Plan:   Is the patient a smoker and needs referral for smoking cessation? No  Patient referred to the following for smoking cessation with an appointment? Not applicable     Patient was offered medication to assist with smoking cessation at discharge? Not applicable  Was education for smoking cessation added to the discharge instructions? Not applicable    Alcohol/Substance Abuse Discharge Plan:   Does the patient have a history of substance/alcohol abuse and requires a referral for treatment? No  Patient referred to the following for substance/alcohol abuse treatment with an appointment? Not applicable  Patient was offered medication to assist with alcohol cessation at discharge? Not applicable  Was education for substance/alcohol abuse added to discharge instructions? Not applicable    Patient discharged to Home; discussed with patient/caregiver and provided to the patient/caregiver either in hard copy or electronically.

## 2021-07-29 NOTE — DISCHARGE INSTRUCTIONS
Patient Education     BEHAVIORAL HEALTH NURSING DISCHARGE NOTE      The following personal items collected during your admission are returned to you:   Dental Appliance:    Vision: Visual Aid: None  Hearing Aid:    Jewelry:    Clothing: Clothing: Shirt, Undergarments, Footwear, Pants, Socks  Other Valuables: Other Valuables: Cookie Sigrid Basurto [id number], [id number] Katarzyna Aburto sent to safe:        PATIENT INSTRUCTIONS:    What to do at Home:    You may resume normal activities. Avoid making any critical decisions for at least 24-hours. Recommended diet: Regular Diet. Recommended activity: Activity as tolerated. [facility] [phone]. National Suicide Prevention Line 1-853.176.7707. If you have problems relating to your recovery, call your physician. The discharge information has been reviewed with the patient. The patient verbalized understanding. Bipolar Disorder: Care Instructions  Your Care Instructions     Bipolar disorder is an illness that causes extreme mood changes, from times of very high energy (manic episodes) to times of depression. But many people with bipolar disorder show only the symptoms of depression. These moods may cause problems with your work, school, family life, friendships, and how well you function. This disease is also called manic-depression. There is no cure for bipolar disorder, but it can be helped with medicines. Counseling may also help. It is important to take your medicines exactly as prescribed, even when you feel well. You may need lifelong treatment. Follow-up care is a key part of your treatment and safety. Be sure to make and go to all appointments, and call your doctor if you are having problems. It's also a good idea to know your test results and keep a list of the medicines you take. How can you care for yourself at home? · Be safe with medicines. Take your medicines exactly as prescribed.  Do not stop or change a medicine without talking to your doctor first. Haroon Randolph and your doctor may need to try different combinations of medicines to find what works for you. · Take your medicines on schedule to keep your moods even. When you feel good, you may think that you do not need your medicines. But it is important to keep taking them. · Go to your counseling sessions. Call and talk with your counselor if you can't go to a session or if you don't think the sessions are helping. Do not just stop going. · Get at least 30 minutes of activity on most days of the week. Walking is a good choice. You also may want to do other things, such as running, swimming, or cycling. · Get enough sleep. Keep your room dark and quiet. Try to go to bed at the same time every night. · Eat a healthy diet. This includes whole grains, dairy, fruits, vegetables, and protein. Eat foods from each of these groups. · Try to lower your stress. Manage your time, build a strong support system, and lead a healthy lifestyle. To lower your stress, try physical activity, slow deep breathing, or getting a massage. · Do not use alcohol, marijuana, or illegal drugs. · Learn the early signs of your mood changes. You can then take steps to help yourself feel better. · Ask for help from friends and family when you need it. You may need help with daily chores when you are depressed. When you are manic, you may need support to control your high energy levels. What should you do if someone in your family has bipolar disorder? · Learn about the disease and signs it's getting worse. · Remind your family member you love them. · Make a plan with all family members about how to take care of your loved one when symptoms are bad. · Remind yourself it will take time for changes to occur. · Try not to blame yourself for the disease. · Know your legal rights and the legal rights of your family member. Support groups or counselors can help with this information. · Take care of yourself.  Keep up with your interests, such as career, hobbies, and friends. Use exercise, positive self-talk, deep breathing, and other relaxing exercises to help lower your stress. · Give yourself time to grieve. You may need to deal with emotions such as anger, fear, and frustration. · If you are having a hard time with your feelings or with your relationship with your family member, talk with a counselor. When should you call for help? Call 911 anytime you think you may need emergency care. For example, call if:    · You feel like hurting yourself or someone else.     · Someone who has bipolar disorder displays dangerous behavior, and you think the person might hurt himself or herself or someone else. Call your doctor now or seek immediate medical care if:    · You hear voices.     · Someone you know has bipolar disorder and talks about suicide. Keep the numbers for these national suicide hotlines: 7-943-132-TALK (4-936.195.1930) and 0-365-DJIPDPC (5-550.816.9330). If a suicide threat seems real, with a specific plan and a way to carry it out, stay with the person, or ask someone you trust to stay with the person, until you can get help.     · Someone you know has bipolar disorder and:  ? Starts to give away possessions. ? Is using illegal drugs or drinking alcohol heavily. ? Talks or writes about death, including writing suicide notes or talking about guns, knives, or pills. ? Talks or writes about hurting someone else. ? Starts to spend a lot of time alone. ? Acts very aggressively or suddenly appears calm. ? Talks about beliefs that are not based in reality (delusions). Watch closely for changes in your health, and be sure to contact your doctor if:    · You cannot go to your counseling sessions. Where can you learn more? Go to http://www.[employer].com/  Enter K052 in the search box to learn more about \"Bipolar Disorder: Care Instructions. \"  Current as of: September 23, 2020               Content Version: 12.8  © 2006-2021 [employer]. Care instructions adapted under license by [employer] (which disclaims liability or warranty for this information). If you have questions about a medical condition or this instruction, always ask your healthcare professional. [address] any warranty or liability for your use of this information. Patient Education        Learning About Mood Disorders  What are mood disorders? Mood disorders are medical problems that affect how you feel. They can impact your moods, thoughts, and actions. Mood disorders include:  · Depression. This causes you to feel sad or hopeless for much of the time. · Bipolar disorder. This causes extreme mood changes from manic episodes of very high energy to extreme lows of depression. · Seasonal affective disorder (SAD). This is a type of depression that affects you during the same season each year. Most often people experience SAD during the fall and winter months when days are shorter and there is less light. What are the symptoms? Depression  You may:  · Feel sad or hopeless nearly every day. · Lose interest in or not get pleasure from most daily activities. You feel this way nearly every day. · Have low energy, changes in your appetite, or changes in how well you sleep. · Have trouble concentrating. · Think about death and suicide. Keep the numbers for these national suicide hotlines: 3-884-338-TALK (9-101.807.5885) and 4-097-VOIFDAX (0-669.777.9302). If you or someone you know talks about suicide or feeling hopeless, get help right away. Bipolar disorder  Symptoms depend on your mood swings. You may:  · Feel very happy, energetic, or on edge. · Feel like you need very little sleep. · Feel overly self-confident. · Do impulsive things, such as spending a lot of money. · Feel sad or hopeless.   · Have racing thoughts or trouble thinking and making decisions. · Lose interest in things you have enjoyed in the past.  · Think about death and suicide. Keep the numbers for these national suicide hotlines: 1-417-585-TALK (9-851.401.9750) and 9-058-ITYHHUM (2-755.357.1196). If you or someone you know talks about suicide or feeling hopeless, get help right away. Seasonal affective disorder (SAD)  Symptoms come and go at about the same time each year. For most people with SAD, symptoms come during the winter when there is less daylight. You may:  · Feel sad, grumpy, singh, or anxious. · Lose interest in your usual activities. · Eat more and crave carbohydrates, such as bread and pasta. · Gain weight. · Sleep more and feel drowsy during the daytime. How are mood disorders treated? Mood disorders can be treated with medicines or counseling, or a combination of both. Medicines for depression and SAD may include antidepressants. Medicines for bipolar disorder may include:  · Mood stabilizers. · Antipsychotics. · Benzodiazepines. Counseling may involve cognitive-behavioral therapy. It teaches you how to change the ways you think and behave. This can help you stop thinking bad thoughts about yourself and your life. Light therapy is the main treatment for SAD. This therapy uses a special kind of lamp. You let the lamp shine on you at certain times, usually in the morning. This may help your symptoms during the months when there is less sunlight. Healthy lifestyle  Healthy lifestyle changes may help you feel better. · Be active often. You might try walking or strength training. · Eat a healthy diet. Include fruits, vegetables, lean proteins, and whole grains in your diet each day. · Keep a regular sleep schedule. Try for 8 hours of sleep a night. · Find ways to manage stress, such as relaxation exercises. · Avoid alcohol and illegal drugs. Follow-up care is a key part of your treatment and safety.  Be sure to make and go to all appointments, and call your doctor if you are having problems. It's also a good idea to know your test results and keep a list of the medicines you take. Where can you learn more? Go to http://www.gray.com/  Enter Z126 in the search box to learn more about \"Learning About Mood Disorders. \"  Current as of: September 23, 2020               Content Version: 12.8  © 2006-2021 [employer]. Care instructions adapted under license by [employer] (which disclaims liability or warranty for this information). If you have questions about a medical condition or this instruction, always ask your healthcare professional. [place] [address] any warranty or liability for your use of this information.

## 2021-07-30 NOTE — DISCHARGE SUMMARY
[facility] DISCHARGE    Name:  Charleen Medley  MR#:  [id number]  :  1990  ACCOUNT #:  [de-identified]  ADMIT DATE:  2021  DISCHARGE DATE:  2021    [facility] DISCHARGE SUMMARY    NOTE:  Greater than 35 minutes was spent in the preparation of this discharge. DISCHARGE DIAGNOSES:  AXIS I:  Bipolar disorder, predominately manic with psychosis (F31.2). AXIS II:  Deferred. AXIS III:  Hyperlipidemia; hypothyroidism; obesity. AXIS IV:  Stressors are mild-to-moderate. AXIS V:  Global Assessment of Functioning at discharge is 72. DISCHARGE MEDICATIONS:  1. Seroquel 300 mg b.i.d. - - #60 pills with no refills. 2.  Wellbutrin- mg daily - - #30 pills with no refills. These prescriptions were sent in electronically to the [facility] in [place]. DISPOSITION/FOLLOWUP PLANS:  The patient is being discharged in a stable condition later this morning, on 2021. She will be returning home where she lives with her mother and stepfather, and she will be following up with the [facility], seeing Tierney Constantino NP, tomorrow, on 2021. HOSPITAL COURSE:  As noted in the admission note by Dr. Vidhya Lemus, the patient is a 34-year-old single female who has a lengthy past psychiatric history of bipolar disorder, often experiencing symptoms of psychosis. She was admitted voluntarily through the [facility] due to increased manic/mixed symptoms, after having run out of medication over the preceding several weeks or more. She started to have psychotic symptoms including auditory hallucinations, but she also was fairly manic for the most part with increased restlessness, pressured speech and behavior, hyperactivity and increased energy levels, decreased need for sleep, hyper talkativeness, and impulsivity with poor judgment.   However, she also was tearful at times and would very quickly fall into a state of dysphoria, but it was fairly short lived compared to the manic/hypomanic symptoms. We restarted the Seroquel and Wellbutrin as noted above, and she tolerated getting back on both of these quite easily. It took several days, but she gradually and steadily showed some improvement in her mood to where it became significantly more stable by the time of discharge. Additionally, the psychotic symptoms seemed to completely resolve. She did not appear to be internally preoccupied or distracted anymore, her thoughts were organized and coherent, and she appeared to be back at her baseline, both with regard to her mood as well as any psychotic symptoms. She also had no side effects to the medication including no sedation, EPS, or symptoms of TD. She also was felt to be medically stable for discharge when she was released, as noted above. LABORATORY DATA:  In the emergency room, her laboratory tests were entirely within normal limits except for a hemoglobin A1c which was elevated at 6.7%. Her glucose was normal at 98. The urine drug screen was negative and alcohol level was 0, and an EKG showed a normal QTc interval of 415 milliseconds.       Tilda Blizzard, MD      [id number]  D:  07/29/2021 10:15  T:  07/30/2021 6:46  JOB #:  [id number]  CC:  [id number]

## 2022-01-08 NOTE — GROUP NOTE
GUILLERMO  GROUP DOCUMENTATION INDIVIDUAL                                                                          Group Therapy Note    Date: 7/28/2021    Group Start Time: 1030  Group End Time: 1100  Group Topic: Community Meeting    [address] GROUP DOCUMENTATION GROUP    Group Therapy Note    Attendees: 4         Attendance: Attended    Patient's Goal:  To stay calm    Interventions/techniques: Promoted peer support    Follows Directions:  Followed directions    Interactions: Interacted appropriately    Mental Status: Calm    Behavior/appearance: Attentive and Cooperative    Goals Achieved: Able to engage in interactions, Able to listen to others, Able to give feedback to another, Able to reflect/comment on own behavior and Able to manage/cope with feelings      Additional Notes:  No complaints of pain, anxiety, or depression, denies having suicidal ideations     Renan Alvarez Patient with Hypoxic Respiratory failure which is Acute on chronic.  she is on home oxygen at 3 LPM. At night Supplemental oxygen was provided and noted-  .   Signs/symptoms of respiratory failure include- increased work of breathing. Contributing diagnoses includes - COPD Labs and images were reviewed. Patient Has recent ABG, which has been reviewed. Will treat underlying causes and adjust management of respiratory failure as follows-     COVID +  No clear diagnosis of COPD  Patient wears 3L 02 but only at night, has had to wear during the day.    - Will use 02 as needed for respiratory support  - Goal 02 88-92%  - Treat Covid Per protocol   - PFTs ordered  - Home spiriva ordered  - Albuterol every 6 hours

## 2022-03-18 PROBLEM — F31.2 SEVERE MANIC BIPOLAR I DISORDER WITH PSYCHOTIC FEATURES (HCC): Status: ACTIVE | Noted: 2019-05-20

## 2022-03-19 PROBLEM — F29 PSYCHOSIS (HCC): Status: ACTIVE | Noted: 2021-07-23

## 2023-09-18 ENCOUNTER — HOSPITAL ENCOUNTER (INPATIENT)
Facility: HOSPITAL | Age: 33
LOS: 8 days | Discharge: HOME | DRG: 753 | End: 2023-09-27
Attending: EMERGENCY MEDICINE | Admitting: PSYCHIATRY & NEUROLOGY
Payer: COMMERCIAL

## 2023-09-18 DIAGNOSIS — F23 ACUTE PSYCHOSIS (HCC): Primary | ICD-10-CM

## 2023-09-18 DIAGNOSIS — Z78.9 UNABLE TO CARE FOR SELF: ICD-10-CM

## 2023-09-18 LAB
ALBUMIN SERPL-MCNC: 3.8 G/DL (ref 3.5–5)
ALBUMIN/GLOB SERPL: 0.9 (ref 1.1–2.2)
ALP SERPL-CCNC: 111 U/L (ref 45–117)
ALT SERPL-CCNC: 26 U/L (ref 12–78)
ANION GAP SERPL CALC-SCNC: 12 MMOL/L (ref 5–15)
APAP SERPL-MCNC: <2 UG/ML (ref 10–30)
AST SERPL-CCNC: 23 U/L (ref 15–37)
BASOPHILS # BLD: 0.1 K/UL (ref 0–0.1)
BASOPHILS NFR BLD: 1 % (ref 0–1)
BILIRUB SERPL-MCNC: 0.3 MG/DL (ref 0.2–1)
BUN SERPL-MCNC: 10 MG/DL (ref 6–20)
BUN/CREAT SERPL: 12 (ref 12–20)
CALCIUM SERPL-MCNC: 8.7 MG/DL (ref 8.5–10.1)
CHLORIDE SERPL-SCNC: 103 MMOL/L (ref 97–108)
CO2 SERPL-SCNC: 23 MMOL/L (ref 21–32)
CREAT SERPL-MCNC: 0.82 MG/DL (ref 0.55–1.02)
DIFFERENTIAL METHOD BLD: ABNORMAL
EOSINOPHIL # BLD: 0.1 K/UL (ref 0–0.4)
EOSINOPHIL NFR BLD: 1 % (ref 0–7)
ERYTHROCYTE [DISTWIDTH] IN BLOOD BY AUTOMATED COUNT: 13.5 % (ref 11.5–14.5)
ETHANOL SERPL-MCNC: <10 MG/DL (ref 0–0.08)
FLUAV RNA SPEC QL NAA+PROBE: NOT DETECTED
FLUBV RNA SPEC QL NAA+PROBE: NOT DETECTED
GLOBULIN SER CALC-MCNC: 4.1 G/DL (ref 2–4)
GLUCOSE SERPL-MCNC: 148 MG/DL (ref 65–100)
HCT VFR BLD AUTO: 41.1 % (ref 35–47)
HGB BLD-MCNC: 13 G/DL (ref 11.5–16)
IMM GRANULOCYTES # BLD AUTO: 0 K/UL (ref 0–0.04)
IMM GRANULOCYTES NFR BLD AUTO: 0 % (ref 0–0.5)
LYMPHOCYTES # BLD: 2.6 K/UL (ref 0.8–3.5)
LYMPHOCYTES NFR BLD: 26 % (ref 12–49)
MCH RBC QN AUTO: 25.3 PG (ref 26–34)
MCHC RBC AUTO-ENTMCNC: 31.6 G/DL (ref 30–36.5)
MCV RBC AUTO: 80 FL (ref 80–99)
MONOCYTES # BLD: 0.6 K/UL (ref 0–1)
MONOCYTES NFR BLD: 6 % (ref 5–13)
NEUTS SEG # BLD: 6.8 K/UL (ref 1.8–8)
NEUTS SEG NFR BLD: 66 % (ref 32–75)
NRBC # BLD: 0 K/UL (ref 0–0.01)
NRBC BLD-RTO: 0 PER 100 WBC
PLATELET # BLD AUTO: 304 K/UL (ref 150–400)
PMV BLD AUTO: 10.1 FL (ref 8.9–12.9)
POTASSIUM SERPL-SCNC: 3.3 MMOL/L (ref 3.5–5.1)
PROT SERPL-MCNC: 7.9 G/DL (ref 6.4–8.2)
RBC # BLD AUTO: 5.14 M/UL (ref 3.8–5.2)
SALICYLATES SERPL-MCNC: <1.7 MG/DL (ref 2.8–20)
SARS-COV-2 RNA RESP QL NAA+PROBE: NOT DETECTED
SODIUM SERPL-SCNC: 138 MMOL/L (ref 136–145)
TSH SERPL DL<=0.05 MIU/L-ACNC: 2.35 UIU/ML (ref 0.36–3.74)
WBC # BLD AUTO: 10.2 K/UL (ref 3.6–11)

## 2023-09-18 PROCEDURE — 87636 SARSCOV2 & INF A&B AMP PRB: CPT

## 2023-09-18 PROCEDURE — 99285 EMERGENCY DEPT VISIT HI MDM: CPT

## 2023-09-18 PROCEDURE — 82077 ASSAY SPEC XCP UR&BREATH IA: CPT

## 2023-09-18 PROCEDURE — 84443 ASSAY THYROID STIM HORMONE: CPT

## 2023-09-18 PROCEDURE — 36415 COLL VENOUS BLD VENIPUNCTURE: CPT

## 2023-09-18 PROCEDURE — 80061 LIPID PANEL: CPT

## 2023-09-18 PROCEDURE — 83036 HEMOGLOBIN GLYCOSYLATED A1C: CPT

## 2023-09-18 PROCEDURE — 80179 DRUG ASSAY SALICYLATE: CPT

## 2023-09-18 PROCEDURE — 80053 COMPREHEN METABOLIC PANEL: CPT

## 2023-09-18 PROCEDURE — 80143 DRUG ASSAY ACETAMINOPHEN: CPT

## 2023-09-18 PROCEDURE — 85025 COMPLETE CBC W/AUTO DIFF WBC: CPT

## 2023-09-19 PROBLEM — F31.2 SEVERE MANIC BIPOLAR I DISORDER WITH PSYCHOTIC FEATURES (HCC): Status: ACTIVE | Noted: 2019-05-20

## 2023-09-19 PROBLEM — F31.9 BIPOLAR DISORDER WITH PSYCHOTIC FEATURES (HCC): Status: RESOLVED | Noted: 2023-09-19 | Resolved: 2023-09-19

## 2023-09-19 PROBLEM — F31.9 BIPOLAR DISORDER WITH PSYCHOTIC FEATURES (HCC): Status: ACTIVE | Noted: 2023-09-19

## 2023-09-19 LAB
AMPHET UR QL SCN: POSITIVE
APPEARANCE UR: CLEAR
BACTERIA URNS QL MICRO: ABNORMAL /HPF
BARBITURATES UR QL SCN: NEGATIVE
BENZODIAZ UR QL: NEGATIVE
BILIRUB UR QL: NEGATIVE
CANNABINOIDS UR QL SCN: NEGATIVE
CHOLEST SERPL-MCNC: 214 MG/DL
COCAINE UR QL SCN: NEGATIVE
COLOR UR: ABNORMAL
EPITH CASTS URNS QL MICRO: ABNORMAL /LPF
EST. AVERAGE GLUCOSE BLD GHB EST-MCNC: 111 MG/DL
GLUCOSE UR STRIP.AUTO-MCNC: NEGATIVE MG/DL
HBA1C MFR BLD: 5.5 % (ref 4–5.6)
HCG UR QL: NEGATIVE
HDLC SERPL-MCNC: 62 MG/DL
HDLC SERPL: 3.5 (ref 0–5)
HGB UR QL STRIP: ABNORMAL
KETONES UR QL STRIP.AUTO: 15 MG/DL
LDLC SERPL CALC-MCNC: 145.2 MG/DL (ref 0–100)
LEUKOCYTE ESTERASE UR QL STRIP.AUTO: NEGATIVE
Lab: ABNORMAL
METHADONE UR QL: NEGATIVE
NITRITE UR QL STRIP.AUTO: NEGATIVE
OPIATES UR QL: NEGATIVE
PCP UR QL: NEGATIVE
PH UR STRIP: 5.5 (ref 5–8)
PROT UR STRIP-MCNC: 30 MG/DL
RBC #/AREA URNS HPF: >100 /HPF (ref 0–5)
SP GR UR REFRACTOMETRY: 1.03 (ref 1–1.03)
TRIGL SERPL-MCNC: 34 MG/DL
URINE CULTURE IF INDICATED: ABNORMAL
UROBILINOGEN UR QL STRIP.AUTO: 0.2 EU/DL (ref 0.2–1)
VLDLC SERPL CALC-MCNC: 6.8 MG/DL
WBC URNS QL MICRO: ABNORMAL /HPF (ref 0–4)

## 2023-09-19 PROCEDURE — 6370000000 HC RX 637 (ALT 250 FOR IP): Performed by: PSYCHIATRY & NEUROLOGY

## 2023-09-19 PROCEDURE — 6370000000 HC RX 637 (ALT 250 FOR IP): Performed by: EMERGENCY MEDICINE

## 2023-09-19 PROCEDURE — 80307 DRUG TEST PRSMV CHEM ANLYZR: CPT

## 2023-09-19 PROCEDURE — 90792 PSYCH DIAG EVAL W/MED SRVCS: CPT | Performed by: PSYCHIATRY & NEUROLOGY

## 2023-09-19 PROCEDURE — 1240000000 HC EMOTIONAL WELLNESS R&B

## 2023-09-19 PROCEDURE — 81001 URINALYSIS AUTO W/SCOPE: CPT

## 2023-09-19 PROCEDURE — 81025 URINE PREGNANCY TEST: CPT

## 2023-09-19 RX ORDER — CHOLECALCIFEROL (VITAMIN D3) 125 MCG
10 CAPSULE ORAL
COMMUNITY
Start: 2023-03-14

## 2023-09-19 RX ORDER — IBUPROFEN 400 MG/1
400 TABLET ORAL EVERY 6 HOURS PRN
Status: DISCONTINUED | OUTPATIENT
Start: 2023-09-19 | End: 2023-09-27 | Stop reason: HOSPADM

## 2023-09-19 RX ORDER — ACETAMINOPHEN 325 MG/1
650 TABLET ORAL EVERY 4 HOURS PRN
Status: DISCONTINUED | OUTPATIENT
Start: 2023-09-19 | End: 2023-09-27 | Stop reason: HOSPADM

## 2023-09-19 RX ORDER — LEVOTHYROXINE SODIUM 0.05 MG/1
25 TABLET ORAL
Status: DISCONTINUED | OUTPATIENT
Start: 2023-09-19 | End: 2023-09-27 | Stop reason: HOSPADM

## 2023-09-19 RX ORDER — TRAZODONE HYDROCHLORIDE 50 MG/1
50 TABLET ORAL NIGHTLY PRN
Status: DISCONTINUED | OUTPATIENT
Start: 2023-09-19 | End: 2023-09-27 | Stop reason: HOSPADM

## 2023-09-19 RX ORDER — HALOPERIDOL 5 MG/1
5 TABLET ORAL EVERY 6 HOURS PRN
Status: DISCONTINUED | OUTPATIENT
Start: 2023-09-19 | End: 2023-09-19

## 2023-09-19 RX ORDER — QUETIAPINE FUMARATE 100 MG/1
100 TABLET, FILM COATED ORAL EVERY 6 HOURS PRN
Status: DISCONTINUED | OUTPATIENT
Start: 2023-09-19 | End: 2023-09-27 | Stop reason: HOSPADM

## 2023-09-19 RX ORDER — BUPROPION HYDROCHLORIDE 150 MG/1
150 TABLET ORAL DAILY
Status: ON HOLD | COMMUNITY
Start: 2021-07-29 | End: 2023-09-27 | Stop reason: HOSPADM

## 2023-09-19 RX ORDER — ALBUTEROL SULFATE 90 UG/1
2 AEROSOL, METERED RESPIRATORY (INHALATION) EVERY 4 HOURS PRN
Status: DISCONTINUED | OUTPATIENT
Start: 2023-09-19 | End: 2023-09-27 | Stop reason: HOSPADM

## 2023-09-19 RX ORDER — POTASSIUM CHLORIDE 750 MG/1
40 TABLET, FILM COATED, EXTENDED RELEASE ORAL ONCE
Status: COMPLETED | OUTPATIENT
Start: 2023-09-19 | End: 2023-09-19

## 2023-09-19 RX ORDER — POLYETHYLENE GLYCOL 3350 17 G/17G
17 POWDER, FOR SOLUTION ORAL DAILY PRN
Status: DISCONTINUED | OUTPATIENT
Start: 2023-09-19 | End: 2023-09-27 | Stop reason: HOSPADM

## 2023-09-19 RX ORDER — MAGNESIUM HYDROXIDE/ALUMINUM HYDROXICE/SIMETHICONE 120; 1200; 1200 MG/30ML; MG/30ML; MG/30ML
30 SUSPENSION ORAL EVERY 6 HOURS PRN
Status: DISCONTINUED | OUTPATIENT
Start: 2023-09-19 | End: 2023-09-27 | Stop reason: HOSPADM

## 2023-09-19 RX ORDER — LORAZEPAM 2 MG/ML
2 INJECTION INTRAMUSCULAR EVERY 6 HOURS PRN
Status: DISCONTINUED | OUTPATIENT
Start: 2023-09-19 | End: 2023-09-27 | Stop reason: HOSPADM

## 2023-09-19 RX ORDER — ALBUTEROL SULFATE 90 UG/1
2 AEROSOL, METERED RESPIRATORY (INHALATION) EVERY 4 HOURS PRN
COMMUNITY

## 2023-09-19 RX ORDER — LEVOTHYROXINE SODIUM 0.03 MG/1
25 TABLET ORAL
Qty: 30 TABLET | Refills: 11 | COMMUNITY
Start: 2023-05-26 | End: 2024-05-25

## 2023-09-19 RX ORDER — QUETIAPINE FUMARATE 300 MG/1
600 TABLET, FILM COATED ORAL
COMMUNITY
Start: 2023-09-18

## 2023-09-19 RX ORDER — HALOPERIDOL 5 MG/ML
5 INJECTION INTRAMUSCULAR EVERY 4 HOURS PRN
Status: DISCONTINUED | OUTPATIENT
Start: 2023-09-19 | End: 2023-09-19

## 2023-09-19 RX ORDER — HYDROXYZINE HYDROCHLORIDE 25 MG/1
50 TABLET, FILM COATED ORAL 3 TIMES DAILY PRN
Status: DISCONTINUED | OUTPATIENT
Start: 2023-09-19 | End: 2023-09-27 | Stop reason: HOSPADM

## 2023-09-19 RX ADMIN — QUETIAPINE FUMARATE 300 MG: 200 TABLET ORAL at 20:57

## 2023-09-19 RX ADMIN — POTASSIUM CHLORIDE 40 MEQ: 750 TABLET, EXTENDED RELEASE ORAL at 01:37

## 2023-09-19 RX ADMIN — LEVOTHYROXINE SODIUM 25 MCG: 0.05 TABLET ORAL at 08:10

## 2023-09-19 RX ADMIN — QUETIAPINE FUMARATE 300 MG: 200 TABLET ORAL at 09:42

## 2023-09-19 RX ADMIN — HYDROXYZINE HYDROCHLORIDE 50 MG: 25 TABLET, FILM COATED ORAL at 20:56

## 2023-09-19 ASSESSMENT — LIFESTYLE VARIABLES
HOW MANY STANDARD DRINKS CONTAINING ALCOHOL DO YOU HAVE ON A TYPICAL DAY: PATIENT DOES NOT DRINK
HOW OFTEN DO YOU HAVE A DRINK CONTAINING ALCOHOL: NEVER

## 2023-09-19 ASSESSMENT — SLEEP AND FATIGUE QUESTIONNAIRES
DO YOU USE A SLEEP AID: NO
SLEEP PATTERN: RESTLESSNESS
AVERAGE NUMBER OF SLEEP HOURS: 0
DO YOU HAVE DIFFICULTY SLEEPING: YES

## 2023-09-19 NOTE — BH NOTE
Report recived from Ke Harmon RN at 950 re:  SBAR, behavior, results and meds. Adequate time given to ask questions. Admitted via  WC to unit as TDO pt, accompanied by security. Alert and oriented to self and situation. Speech illogical at times. Talks to self at times and thought blocking and delayed responses noted. Affect labile; Mood labile. Smoking and alcohol danger situations, triggers, coping mechanisms, resources discussed and states she does not smoke. Does not hold a professional license in [facility]. Dr. Robert Carlisle notified of arrival, along with RNS. Treatment will focus on deejay for safety, medication and group activities to better manage sleep/anxiety/depression/mood lability/agitation. Having paranoid delusions about not trusting \"the  and a certain doctor. I won't take Haldol! \" No

## 2023-09-19 NOTE — BH NOTE
Patient was admitted to the unit under a TDO. Patient's hearing will be held on Thursday September 21. Patient has been living on her own in her apartment. She reports not being able to have a ride scheduled with [employer] to  her medications which she has been without 2 weeks. She presents as being manic, delusional, flight of ideas and poor self care. She is able to do her ADL care. She plans on returning back to her apartment and she is connected with [facility].

## 2023-09-19 NOTE — BH NOTE
[address]  Master Treatment Plan for Ed Keto    Date Treatment Plan Initiated: 09/19/2023    Treatment Plan Modalities:  Type of Modality Amount  (x minutes) Frequency (x/week) Duration (x days) Name of Responsible Staff   [facility],8W meetings to encourage peer interactions 30 14 1 Esteban Da Silva., [facility]   Group psychotherapy to assist in building coping skills and internal controls 60 5 1 Aliya Wallace., [facility]  Salma Victoria., [facility]   Therapeutic activity groups to build coping skills 60 7 1 Megha Cole., MYLENE Miles., RN     Psychoeducation in group setting to address:   Medication education  Coping Skills  Symptom Management   60 7 1 Aliya Wallace., [facility]  Salma Victoria., [facility]  Roberta Fried., RN  Stephen Hameed RN   Discharge planning   60 2 1 Mortimer Commons., [facility] II   Spirituality    60 2 1 Naty Edwards medication management   15 7 1 KERRIE Smith MD                                         Treatment Team Signatures    I have participated in the development of this plan of treatment and agree to its implementation.     Patient Signature       Patient Printed Name Date/Time   Social Work/Therapist Signature       Social Work/Therapist Printed Name Date/Time   RN Signature       RN Printed Name    Maddie Briseno RN Date/Time    09/19/2023 @ 3797   Other Signature     Other Signature Date/Time   MD Signature       MD Printed Name Date/Time

## 2023-09-19 NOTE — H&P
interrupt her. She was overly animated, and described her mood as being anxious and slightly depressed despite evidence of the ricci. She denied any suicidal thoughts or urges or impulses to want to harm herself, and denied any aggressive thoughts to harm anybody else. There was no evidence of agitation or aggression objectively. She exhibited some clear delusional thinking as well as some scattered thinking due to her flight of ideas and racing thoughts. Her judgment and insight are obviously severely impaired. Cognitively, her concentration and attention span were notably impaired in her manic state. Her fund of knowledge appeared to be at least average. DIAGNOSTIC IMPRESSION:  AXIS I:  Bipolar disorder, currently manic, with psychosis (F31.2). AXIS II:  Deferred. AXIS III:  Obesity; hyperlipidemia; hypothyroidism; history of asthma. AXIS IV:  Stressors are mild-to-moderate (limited support system). AXIS V:  Global Assessment of Functioning currently is 35-40. PLAN:  1. We will admit the patient for further supportive treatment, close observation, increased structure, and involvement within the groups and milieu as tolerated. 2.  We will restart the Seroquel, and I will order 300 mg b.i.d. to help titrate the dose as quickly as possible and help settle down some of her manic symptoms and allow her to sleep. 3.  We will hold off on the antidepressant, particularly given that she is quite manic at this point. I would also continue the Synthroid and have p.r.n. trazodone and Vistaril available for sleep and anxiety respectively. 4.  Estimated length of stay would likely be on the order of 5-7 days given her history. She will follow up with the [facility] in [address] [facility] [place]. I certify that this patient's inpatient psychiatric hospital admission is medically necessary for treatment which could reasonably be expected to improve her condition or for diagnostic study.   The inpatient

## 2023-09-19 NOTE — BSMART NOTE
Bsmart Progress Note:    Per Triage Note:    Writer made aware of Bsmart Consult of Pt who is a 27 y/o female with hx of Bipolar disorder. Writer attempted to assess Pt although, Pt appears to lack capacity during this time to consent to treatment . Pt struggled to answer writer's questions and observed continuously talking over writer with tangential speech. Pt presented with word salad , disorganized thoughts process overheard reporting bizarre statements and suspicion of \"someone hacking her phone and hearing phone through my tv\", reporting her mother to be impersonating her and living as a man. Pt observed talking without anyone in the room as if she were having a conversation with a actual person even after this writer ended interview. Pt unable to be assessed for voluntary admission and will be referred to [address] [place] [address] for Crisis for prescreen for TDO once medically cleared . Writer updated ED, Provider Chet.

## 2023-09-19 NOTE — ED PROVIDER NOTES
Differential Type AUTOMATED     CMP    Collection Time: 09/18/23 10:30 PM   Result Value Ref Range    Sodium 138 136 - 145 mmol/L    Potassium 3.3 (L) 3.5 - 5.1 mmol/L    Chloride 103 97 - 108 mmol/L    CO2 23 21 - 32 mmol/L    Anion Gap 12 5 - 15 mmol/L    Glucose 148 (H) 65 - 100 mg/dL    BUN 10 6 - 20 MG/DL    Creatinine 0.82 0.55 - 1.02 MG/DL    Bun/Cre Ratio 12 12 - 20      Est, Glom Filt Rate >60 >60 ml/min/1.73m2    Calcium 8.7 8.5 - 10.1 MG/DL    Total Bilirubin 0.3 0.2 - 1.0 MG/DL    ALT 26 12 - 78 U/L    AST 23 15 - 37 U/L    Alk Phosphatase 111 45 - 117 U/L    Total Protein 7.9 6.4 - 8.2 g/dL    Albumin 3.8 3.5 - 5.0 g/dL    Globulin 4.1 (H) 2.0 - 4.0 g/dL    Albumin/Globulin Ratio 0.9 (L) 1.1 - 2.2     Acetaminophen Level    Collection Time: 09/18/23 10:30 PM   Result Value Ref Range    Acetaminophen Level <2 (L) 10 - 30 ug/mL   Ethanol    Collection Time: 09/18/23 10:30 PM   Result Value Ref Range    Ethanol Lvl <91 <39 MG/DL   Salicylate    Collection Time: 09/18/23 10:30 PM   Result Value Ref Range    Salicylate, Serum <4.5 (L) 2.8 - 20.0 MG/DL   TSH    Collection Time: 09/18/23 10:30 PM   Result Value Ref Range    TSH, 3RD GENERATION 2.35 0.36 - 3.74 uIU/mL   COVID-19 & Influenza Combo    Collection Time: 09/18/23 10:45 PM    Specimen: Nasopharyngeal   Result Value Ref Range    SARS-CoV-2, PCR Not detected NOTD      Rapid Influenza A By PCR Not detected NOTD      Rapid Influenza B By PCR Not detected NOTD     Urine Preg (Lab)    Collection Time: 09/19/23 12:19 AM   Result Value Ref Range    HCG(Urine) Pregnancy Test Negative NEG     Urinalysis with Reflex to Culture    Collection Time: 09/19/23 12:19 AM    Specimen: Urine   Result Value Ref Range    Color, UA YELLOW/STRAW      Appearance CLEAR CLEAR      Specific Gravity, UA 1.027 1.003 - 1.030      pH, Urine 5.5 5.0 - 8.0      Protein, UA 30 (A) NEG mg/dL    Glucose, UA Negative NEG mg/dL    Ketones, Urine 15 (A) NEG mg/dL    Bilirubin Urine

## 2023-09-19 NOTE — PLAN OF CARE
Problem: Discharge Planning  Goal: Discharge to home or other facility with appropriate resources  Recent Flowsheet Documentation  Taken 9/19/2023 0434 by Luisa Goltz, RN  Discharge to home or other facility with appropriate resources: Identify barriers to discharge with patient and caregiver     Problem: Risk for Elopement  Goal: Patient will not exit the unit/facility without proper excort  Recent Flowsheet Documentation  Taken 9/19/2023 0434 by Luisa Goltz, RN  Nursing Interventions for Elopement Risk: Collaborate with family members/caregivers to mitigate the elopement risk  Taken 9/18/2023 2225 by Elisabeth Rincon RN  Nursing Interventions for Elopement Risk:   Assist with personal care needs such as toileting, eating, dressing, as needed to reduce the risk of wandering   Shoes and clothing collected and placed in gown attire   Make sure patient has all necessary personal care items   Communicate to physician the risk for elopement   Communicate/escalate to nursing supervisor the risk of elopement   Communicate/escalate to /other team member the risk of elopement   Communicate/escalate to charge nurse the risk of elopement  Taken 9/18/2023 2219 by Elisabeth Rincon RN  Nursing Interventions for Elopement Risk:   Communicate/escalate to /other team member the risk of elopement   Communicate/escalate to nursing supervisor the risk of elopement   Communicate to physician the risk for elopement   Make sure patient has all necessary personal care items

## 2023-09-19 NOTE — PLAN OF CARE
Problem: Discharge Planning  Goal: Discharge to home or other facility with appropriate resources  Recent Flowsheet Documentation  Taken 9/19/2023 0434 by Beatriz Small RN  Discharge to home or other facility with appropriate resources: Identify barriers to discharge with patient and caregiver     Problem: Risk for Elopement  Goal: Patient will not exit the unit/facility without proper excort  Recent Flowsheet Documentation  Taken 9/19/2023 0434 by Beatriz Small RN  Nursing Interventions for Elopement Risk: Collaborate with family members/caregivers to mitigate the elopement risk  Taken 9/18/2023 2225 by Stacie Bro RN  Nursing Interventions for Elopement Risk:   Assist with personal care needs such as toileting, eating, dressing, as needed to reduce the risk of wandering   Shoes and clothing collected and placed in gown attire   Make sure patient has all necessary personal care items   Communicate to physician the risk for elopement   Communicate/escalate to nursing supervisor the risk of elopement   Communicate/escalate to /other team member the risk of elopement   Communicate/escalate to charge nurse the risk of elopement  Taken 9/18/2023 2219 by Stacie Bro RN  Nursing Interventions for Elopement Risk:   Communicate/escalate to /other team member the risk of elopement   Communicate/escalate to nursing supervisor the risk of elopement   Communicate to physician the risk for elopement   Make sure patient has all necessary personal care items

## 2023-09-19 NOTE — PLAN OF CARE
Patient affect/flat and mood labile. Patient alert and oriented x 4 with disorganized racing thoughts. Patient speech pressured and behavior slightly agitated. Patient hyper-talkative and very manic. Denies pain. No SI/HI/AVH. Patient  paranoid,delusional and suspicious. Patient cooperative and pleasant. Medication compliant with encouragement due to being suspicious about the color of the pills. Patient said that her pills at home is another color. Medication education given. No PRN's administered. Appetite adequate. Patient attended and engaged in groups to the best of her ability. Patient in no apparent distress. Vitals signs within normal limits. Chest x ray and Covid test done as ordered. C-SSRS score rated low. Treatment plans up to date. No falls noted on this shift.

## 2023-09-19 NOTE — BSMART NOTE
Bsmart Progress Note:    Pt referred to [address] [place] [address] for prescreen at [facility]     1:16am Maisha Friend informed this writer that she had completed prescreen and will be recommending  TDO

## 2023-09-19 NOTE — PLAN OF CARE
Problem: Risk for Elopement  Goal: Patient will not exit the unit/facility without proper excort  9/19/2023 1648 by Mee Duran RN  Outcome: Progressing  9/19/2023 1517 by Mee Duran RN  Outcome: Progressing  9/19/2023 1515 by Mee Duran RN  Outcome: Progressing  Flowsheets (Taken 9/19/2023 0434 by Viviana Traylor RN)  Nursing Interventions for Elopement Risk: Collaborate with family members/caregivers to mitigate the elopement risk     Problem: Respiratory - Adult  Goal: Able to breathe comfortably  Description: Able to breathe comfortably  9/19/2023 1648 by Mee Duran RN  Outcome: Progressing  9/19/2023 1517 by Mee Duran RN  Outcome: Progressing     Problem: Involuntary Admit  Goal: Will cooperate with staff recommendations and doctor's orders and will demonstrate appropriate behavior  Description: INTERVENTIONS:  1. Treat underlying conditions and offer medication as ordered  2. Educate regarding involuntary admission procedures and rules  3.  Contain excessive/inappropriate behavior per unit and hospital policies  Outcome: Progressing

## 2023-09-20 PROCEDURE — 1240000000 HC EMOTIONAL WELLNESS R&B

## 2023-09-20 PROCEDURE — 99232 SBSQ HOSP IP/OBS MODERATE 35: CPT | Performed by: PSYCHIATRY & NEUROLOGY

## 2023-09-20 PROCEDURE — 6370000000 HC RX 637 (ALT 250 FOR IP): Performed by: PSYCHIATRY & NEUROLOGY

## 2023-09-20 RX ORDER — QUETIAPINE FUMARATE 200 MG/1
400 TABLET, FILM COATED ORAL
Status: DISCONTINUED | OUTPATIENT
Start: 2023-09-21 | End: 2023-09-21

## 2023-09-20 RX ORDER — QUETIAPINE FUMARATE 200 MG/1
600 TABLET, FILM COATED ORAL
Status: DISCONTINUED | OUTPATIENT
Start: 2023-09-21 | End: 2023-09-27 | Stop reason: HOSPADM

## 2023-09-20 RX ADMIN — QUETIAPINE FUMARATE 300 MG: 200 TABLET ORAL at 09:09

## 2023-09-20 RX ADMIN — TRAZODONE HYDROCHLORIDE 50 MG: 50 TABLET ORAL at 21:10

## 2023-09-20 RX ADMIN — LEVOTHYROXINE SODIUM 25 MCG: 0.05 TABLET ORAL at 06:27

## 2023-09-20 NOTE — BH NOTE
PSYCHOSOCIAL ASSESSMENT  :Patient identifying info:   Terese Weems is a 28 y.o., female admitted 9/18/2023 10:18 PM     Presenting problem and precipitating factors: The patient is a 27-year-old single female who has a well-documented past psychiatric history of bipolar disorder, and who was admitted through the [facility] emergency room on a TDO due to escalation of ricci and psychosis. The patient actually called the police herself requesting to be brought to the emergency room because she had been out of medication for roughly 2 weeks. She [place] [facility] to [facility] [place] to go to the pharmacy, and consequently she was not able to get her Seroquel refilled. Not surprisingly, she became quite manic and psychotic again, and this is consistent with prior hospitalizations that she has had before (being off medicine for short period of time and quickly becoming manic and psychotic). She is very pressured, slightly agitated, but not aggressive, hypertalkative, and her thoughts are very loose, tangential, and she is very easily distracted. Her self-care has been poor. Her insight and judgment are obviously impaired as well, yet she recognized it at some level that she needed help which is what led her to call the police. She has also developed some symptoms of psychosis, specifically some delusions, one of which is that her mother may be an imposter named \"Raul\". She is very clear that she does not want anyone to talk to her mother just yet. She also has had some delusions of reference, getting messages over the TV years and phone. Objectively, she presents as being quite manic in that she is hypertalkative, difficult to interrupt, very tangential, and even exhibiting some flight of ideas at times.     Mental status assessment: Appearance: wearing scrubs; adequately groomed  Behavior: some restlessness and odd behaviors  Motor: less hyperactive  Mood/Affect: still elevated and

## 2023-09-20 NOTE — BH NOTE
Mood is isolative to self, quiet and calm, she is cooperative with staff interventions. Denies pain, SI/HI/AVH. She consumes snack. Sit in the dayroom, and work on a puzzle. She is compliant with medication regiment. No unsafe behaviors at this time.     PRN Medication Documentation    Specific patient behavior that led to need for PRN medication: \"can I have something to relax me\"  Staff interventions attempted prior to PRN being given: emotional support, education on medications prescribed  PRN medication given: Vistaril 50 mg by mouth @2056  Patient response/effectiveness of PRN medication: effective     Asleep 7.30 hours this tour

## 2023-09-20 NOTE — BH NOTE
Affect and mood labile. Patient alert and oriented x 4. Denies pain. No SI/HI/AVH. Patient remains paranoid and delusional  Patient hyper-talkative with disorganized thoughts. Patient cooperative with medications after encouragement. Patient appetite adequate. Patient attended and engaged in groups with prompting. Patient in no apparent distress. Patient pulse-104 but all other vitals signs within normal limits and MD aware. C-SSRS score rated low. Treatment plan up to date. No falls noted on this shift.

## 2023-09-20 NOTE — PLAN OF CARE
Problem: Anxiety  Goal: Will report anxiety at manageable levels  Description: INTERVENTIONS:  1. Administer medication as ordered  2. Teach and rehearse alternative coping skills  3.  Provide emotional support with 1:1 interaction with staff  Outcome: Progressing     Problem: Risk for Elopement  Goal: Patient will not exit the unit/facility without proper excort  9/19/2023 2031 by Jean Paul Ware, RN  Outcome: Progressing  9/19/2023 1648 by Fadia Chan, RN  Outcome: Progressing  9/19/2023 1517 by Fadia Chan RN  Outcome: Progressing  9/19/2023 1515 by Fadia Chan, RN  Outcome: Kunal Lipa (Taken 9/19/2023 0434 by Sara Peterson, RN)  Nursing Interventions for Elopement Risk: Collaborate with family members/caregivers to mitigate the elopement risk

## 2023-09-20 NOTE — PLAN OF CARE
Problem: Musculoskeletal - Adult  Goal: Absence of physical injury  Description: Absence of physical injury  Outcome: Progressing     Problem: Involuntary Admit  Goal: Will cooperate with staff recommendations and doctor's orders and will demonstrate appropriate behavior  Description: INTERVENTIONS:  1. Treat underlying conditions and offer medication as ordered  2. Educate regarding involuntary admission procedures and rules  3.  Contain excessive/inappropriate behavior per unit and hospital policies  Outcome: Progressing     Problem: Safety - Adult  Goal: Free from fall injury  Outcome: Progressing

## 2023-09-20 NOTE — BH NOTE
Behavioral Health Interdisciplinary Rounds     Patient Name: Kiley Rodriguez  Age: 28 y.o. Room/Bed:  236/01  Primary Diagnosis: Severe manic bipolar I disorder with psychotic features ([facility])   Admission Status: TDO     Readmission within 30 days: No  Power of  in place: No  Patient requires a blocked bed: No14}          Reason for blocked bed:     Sleep hours: 7.5       Participation in Care/Groups:  Yes  Medication Compliant?: Yes  PRNS (last 24 hours): Antianxiety    Restraints (last 24 hours):  No  Substance Abuse:  No    24 hour chart check complete: Yes    Patient goal(s) for today: get back on my medications and feel like myself  Treatment team focus/goals:  Will report anxiety at manageable levels  Progress note: patient will have TDO hearing tomorrow continues to be hypomanic with racing thoughts    LOS:  1  Expected LOS: 4-5    Financial concerns/prescription coverage:  No  Family contact: no      Family requesting physician contact today:  No  Discharge plan: home  Access to weapons: No       Outpatient provider(s): [facility]  Patient's preferred phone number for follow up call: [phone]     Participating treatment team members: Dr. Leonor Ortiz, Ke Vásquez

## 2023-09-20 NOTE — GROUP NOTE
Group Therapy Note    Date: 9/20/2023    Group Start Time: 0900  Group End Time: 0950  Group Topic: Process Group - Inpatient    [address] [facility] [place],[address] Floor, [facility]        Group Therapy Note    Attendees: 5 scheduled       Patient's Goal:    Discharge to home or other facility with appropriate resources             Notes:  John Vegas participated in group with prompting. She was unable to stay awake in group and she fell asleep during it. Status After Intervention:  Unchanged    Participation Level: None    Participation Quality: None      Speech:  Soft      Thought Process/Content: not assessed      Affective Functioning: Flat      Mood: Not assessed      Level of consciousness:  Inattentive      Response to Learning: No response      Endings: None Reported    Modes of Intervention: None      Discipline Responsible: /Counselor      Signature:   Jacinto Stauffer LCSW

## 2023-09-20 NOTE — BH NOTE
Patient had a fall this shift 2023 @2130 in the dayroom area near the sink, on [address]. Staff witnessed fall. Patient did not hit her head. When asked to remain still, she was persistent in moving to the chair. She denied pain when asked. No bruises noted. \"My legs gave out\". She remains alert, is able to voice her name, , and where she was located. She denies dizziness, \"I'm ok, I just want to go to bed\". She refused to let staff assess her vitals in the day room. Staff walk with her to her bedroom, where vitals were assessed, and recorded. Her heart rate noted elevated after several checks via the monitor, however different, and lower, with a manual check. Provider, Dr. Jean Thrasher notified of fall, and heart rate. No new orders received, instructions were given to continue monitoring for safety, and to follow-up with him with any new changes. Patient is stable at this time. She is in bed resting with eyes closed. No distress noted.

## 2023-09-20 NOTE — BH NOTE
[address] [facility] RECORD NUMBER:  [id number]  AGE: 28 y.o. GENDER: female  : 1990  TODAYS DATE:  2023    Details     Fall Occurred: Yes    Was the Fall Witnessed:  Yes       Brief Review of Event:Patient had a fall this shift 2023 @2130 in the dayroom area near the sink, on [address]. Staff witnessed fall. Patient did not hit her head. When asked to remain still, she was persistent in moving to the chair         Who found the patient: staff      Where was the patient at the time of the fall: in the dayroom area      Patient Comments: \"my legs gave out, I'm ok, I just want to go to bed\"       Date Fall Occurred:  2023 . Time Fall Occurred: 9:30p.m.      Assessment     Post Fall Head to Toe Assessment Completed: Yes    Post Fall Predictive Analytic Score Reviewed: No:      Post Fall Vitals Completed: Yes    Post Fall Neuro Checks Completed: No: she did not hit her head no LOC    Injury Occurred(if yes, describe injury):  no           Did the Patient Experience:(Check Eric Riggers all that apply)    [] Patient hit head  [] Loss of consciousness  [] Change in mental status following the fall  [] Patient is on an anticoagulant medication      CT Performed:  no    Follow-up     Persons Notified of Fall:  (Provide names of persons notified)   [x] Physician:   [] SHAI:  [x] Nursing Supervisior:  [] Manager:  [] Pharmacist:  [] Family:  [] Other:      Electronically signed by Dana Morris RN 2023 at 7:21 AM

## 2023-09-21 PROCEDURE — 1240000000 HC EMOTIONAL WELLNESS R&B

## 2023-09-21 PROCEDURE — 99232 SBSQ HOSP IP/OBS MODERATE 35: CPT | Performed by: PSYCHIATRY & NEUROLOGY

## 2023-09-21 PROCEDURE — 6370000000 HC RX 637 (ALT 250 FOR IP): Performed by: PSYCHIATRY & NEUROLOGY

## 2023-09-21 RX ADMIN — LEVOTHYROXINE SODIUM 25 MCG: 0.05 TABLET ORAL at 06:41

## 2023-09-21 RX ADMIN — QUETIAPINE FUMARATE 600 MG: 200 TABLET ORAL at 21:22

## 2023-09-21 ASSESSMENT — PAIN SCALES - GENERAL
PAINLEVEL_OUTOF10: 0
PAINLEVEL_OUTOF10: 0

## 2023-09-21 NOTE — BH NOTE
Patient alert and oriented x4. Cooperative but guarded. Conversation initiated to ease mood. Blunt affect. Appropriate affect. Suspicious mood. Ambulates independently. Thought blocking noted. Paranoid. Did come out of room for snack. Ate 100%. Heart rate elevated at 112 at 1935 blood pressure 136/74. Attempted to recheck at that time but patient was in the restroom so checked at 2030 and blood pressure was 128/75, . MD, Dr. Christine Robles notified at 2022. No new orders at this time. Will continue to monitor. Patient denies pain or dizziness. Denies SI/HI/AVH. Requested and received prn Trazodone for sleep. Rested in bed with eyes closed for 7 hours 30 minutes.      PRN Medication Documentation    Specific patient behavior that led to the need for PRN medication: restless  Staff interventions attempted prior to PRN being given: patient requested  PRN medication given: Trazodone 50 mg by mouth at 2110  Patient responsiveness/effectiveness of PRN medication: effective

## 2023-09-21 NOTE — BH NOTE
Patient had her commitment hearing this morning and patient participated. She was hypomanic and her thoughts were mostly organized. She was able to express the challenges she has had getting her medications primarily with family. Patient was involuntarily committed. Patient recognizes the need to be back on her medication and be in a better space. Patient has her own apartment that she plans to return to. She is connected with [facility]. Patient and this writer will work to develop a plan  to ensure she does not run out of her medications and she has a back up plan to get her medications from the pharmacy. Spoke with her CM from the [address] [place] [address] she will be making a referral for skilled building if patient is agreeable. Also, she is agreeable to patient calling her if she was unable to  her medication before she is out, She stated she will remind her of this as well.

## 2023-09-21 NOTE — BH NOTE
Affect constricted, mood depressed/anxious. Alert and oriented X 4. Cooperative and pleasant. Attended and participated in group. Interacted with staff and peers. Makes needs known. Ate all meals and snacks. Denied SI/HI/AVH and pain. Medication compliant. Stable with no s/s of distress. She had TDO hearing today and now involuntary committed. Quiet and in day room most of shift putting puzzle together.

## 2023-09-21 NOTE — PLAN OF CARE
Problem: Risk for Elopement  Goal: Patient will not exit the unit/facility without proper excort  Outcome: Progressing     Problem: Respiratory - Adult  Goal: Able to breathe comfortably  Description: Able to breathe comfortably  Outcome: Progressing     Problem: Cardiovascular - Adult  Goal: Oriented to person, place, and time  Outcome: Progressing     Problem: Musculoskeletal - Adult  Goal: Absence of physical injury  Description: Absence of physical injury  Outcome: Progressing     Problem: Anxiety  Goal: Will report anxiety at manageable levels  Description: INTERVENTIONS:  1. Administer medication as ordered  2. Teach and rehearse alternative coping skills  3.  Provide emotional support with 1:1 interaction with staff  Recent Flowsheet Documentation  Taken 9/21/2023 0804 by Herbert Lopez RN  Will report anxiety at manageable levels:   Administer medication as ordered   Provide emotional support with 1:1 interaction with staff

## 2023-09-21 NOTE — PLAN OF CARE
Problem: Respiratory - Adult  Goal: Able to breathe comfortably  Description: Able to breathe comfortably  Outcome: Progressing     Problem: Cardiovascular - Adult  Goal: Oriented to person, place, and time  Outcome: Progressing     Problem: Musculoskeletal - Adult  Goal: Absence of physical injury  Description: Absence of physical injury  9/20/2023 1652 by Darcy Anand RN  Outcome: Progressing     Problem: Involuntary Admit  Goal: Will cooperate with staff recommendations and doctor's orders and will demonstrate appropriate behavior  Description: INTERVENTIONS:  1. Treat underlying conditions and offer medication as ordered  2. Educate regarding involuntary admission procedures and rules  3.  Contain excessive/inappropriate behavior per unit and hospital policies  6/14/0510 4548 by Darcy Anand RN  Outcome: Progressing     Problem: Safety - Adult  Goal: Free from fall injury  9/20/2023 2137 by Kannan Tavares RN  Outcome: Progressing  9/20/2023 1652 by Darcy Anand RN  Outcome: Progressing

## 2023-09-21 NOTE — GROUP NOTE
Group Therapy Note    Date: 9/21/2023    Group Start Time: 0900  Group End Time: 1098  Group Topic: Process Group - Inpatient    [address] [facility]    Annette Carlos, [facility]        Group Therapy Note    Attendees: 6 scheduled      Focus of session was on understanding and implementing the worry tree as a way to eliminate worry about things in our lives that we currently cannot do anything about. We discussed how about 90% of our current worries are about the unimportant, the unlikely, and the unresolved. I shared with group how the worry tree can be effective for dealing with worries to let them go, even if they have to do it over and over. I had group members share a worry that is able to be resolved and one that is not at this time and how to practice letting go of that worry in order to free up emotional energy and space in our minds. Group members were given an assignment to let go of one worry this week that is resolvable or one that is not. Patient's Goal:     Discharge to home or other facility with appropriate resources                 Notes:  Pt listened to the group activity and engaged when promoted. Pt was more alert and vocal today. She was able to express her thoughts in a more comprehensible manner. She discussed how she was feeling better now with her medication and she knows she needs to take it on a regular basis.  She also discussed having to advocate for herself and with her  in the past, and \"get people to listen to me and believe me even though I have a mental illness\"     Status After Intervention:  Improved    Participation Level: Interactive    Participation Quality: Appropriate and Attentive      Speech:  normal      Thought Process/Content: Logical      Affective Functioning: Congruent      Mood: brighter      Level of consciousness:  Alert and Attentive      Response to Learning: Able to

## 2023-09-22 PROCEDURE — 6370000000 HC RX 637 (ALT 250 FOR IP): Performed by: PSYCHIATRY & NEUROLOGY

## 2023-09-22 PROCEDURE — 99232 SBSQ HOSP IP/OBS MODERATE 35: CPT | Performed by: PSYCHIATRY & NEUROLOGY

## 2023-09-22 PROCEDURE — 1240000000 HC EMOTIONAL WELLNESS R&B

## 2023-09-22 RX ADMIN — QUETIAPINE FUMARATE 600 MG: 200 TABLET ORAL at 20:50

## 2023-09-22 RX ADMIN — LEVOTHYROXINE SODIUM 25 MCG: 0.05 TABLET ORAL at 06:16

## 2023-09-22 ASSESSMENT — PAIN SCALES - GENERAL
PAINLEVEL_OUTOF10: 0
PAINLEVEL_OUTOF10: 0

## 2023-09-22 NOTE — BH NOTE
Affect appropriate, mood anxious. Patient a & o x 4. Denies pain. No SI, HI, AVH. Patient tolerated medications well, cooperative. Ate snacks in the dayroom. Patient was jittery and paranoid at times during this shift. Woke up stating she was having VH but quickly went back to sleep. Currently sleeping during the time of this note.      PRN Medication Documentation    Specific patient behavior that led to need for PRN medication: Requested Sleep Aid  Staff interventions attempted prior to PRN being given: Assessment  PRN medication given: Trazodone 50mg at 2122  Patient response/effectiveness of PRN medication: Tolerated Well    Slept  6.30hrs Spoke with patient over the phone. She says her anxiety has not been controlled so she will be needing more time off work. I have written this excuse for her and advised her to schedule an apt to further discuss medication management.

## 2023-09-22 NOTE — GROUP NOTE
Group Therapy Note    Date: 9/22/2023    Pt did not attend group today.   I will continue to encourage her participation in the future        Signature:  Nicky Guardado LCSW

## 2023-09-22 NOTE — PLAN OF CARE
Problem: Anxiety  Goal: Will report anxiety at manageable levels  Description: INTERVENTIONS:  1. Administer medication as ordered  2. Teach and rehearse alternative coping skills  3.  Provide emotional support with 1:1 interaction with staff  Outcome: Progressing  Flowsheets (Taken 9/22/2023 0815)  Will report anxiety at manageable levels:   Provide emotional support with 1:1 interaction with staff   Administer medication as ordered     Problem: Safety - Adult  Goal: Free from fall injury  Outcome: Progressing     Problem: Pain  Goal: Verbalizes/displays adequate comfort level or baseline comfort level  Outcome: Progressing  Flowsheets (Taken 9/22/2023 0816)  Verbalizes/displays adequate comfort level or baseline comfort level: Encourage patient to monitor pain and request assistance     Problem: Discharge Planning  Goal: Discharge to home or other facility with appropriate resources  Recent Flowsheet Documentation  Taken 9/22/2023 0815 by Russell Homans, RN  Discharge to home or other facility with appropriate resources: Identify barriers to discharge with patient and caregiver     Problem: Risk for Elopement  Goal: Patient will not exit the unit/facility without proper excort  Recent Flowsheet Documentation  Taken 9/22/2023 0815 by Russell Homans, RN  Nursing Interventions for Elopement Risk: Collaborate with family members/caregivers to mitigate the elopement risk

## 2023-09-22 NOTE — PLAN OF CARE
Problem: Risk for Elopement  Goal: Patient will not exit the unit/facility without proper excort  9/21/2023 2221 by Ame Herzog RN  Outcome: Progressing     Problem: Musculoskeletal - Adult  Goal: Absence of physical injury  Description: Absence of physical injury  9/21/2023 2221 by Ame Herzog RN  Outcome: Progressing     Problem: Anxiety  Goal: Will report anxiety at manageable levels  Description: INTERVENTIONS:  1. Administer medication as ordered  2. Teach and rehearse alternative coping skills  3.  Provide emotional support with 1:1 interaction with staff  Outcome: Progressing

## 2023-09-22 NOTE — GROUP NOTE
[facility] Group Therapy Note    Date: 9/21/2023    Group Start Time: 2015  Group End Time: 2045  Group Topic: Community Meeting    [address] [facility]        Group Therapy Note    Attendees: 4         Notes:  Nikki Rincon was out for the [employer] tonight. She stated that her day was good, ate okay, and will sleep good tonight. She is having no anxiety nor depression. Something good that happened was coming in but northing bad has happened today. She is having no thoughts of hurting herself nor anyone else and no pain.     Status After Intervention:  Unchanged    Participation Level: Minimal    Participation Quality: Appropriate      Speech:  normal      Thought Process/Content: Perseverating      Affective Functioning: Flat      Mood: fearful      Level of consciousness:  Preoccupied      Response to Learning: Able to retain information      Endings: None Reported    Modes of Intervention: Activity      Discipline Responsible: Behavorial Health Tech      Signature:  Tanya Carrillo CNA

## 2023-09-22 NOTE — BH NOTE
Affect labile; irritable. Mood labile. Cooperative. Does not initiate conversation and does not like to be questioned by staff. Knows how to ask for what she needs. Showered and changed clothes. Denies SI/HI/AVH/pain. Isolative to room to sleep in between meals. Ate well. Did not have snack today. Did not attend group as active participant. Did not make phone calls and did not want to receive any today. Pacing at times in room.

## 2023-09-22 NOTE — BH NOTE
Patient's ADL's are improving as she is requested clean scrubs and linens to shower. She is not as restless or labile. Her thoughts are more organized each time speaking to her. She will be returning back home to her apartment. This writer will continue to encourage patient to reach out to her CM before medications run out if she is having difficultly picking up her scripts. Per patients  patient is comfortable reaching out to her and capable. Patient continues to advocate for self.

## 2023-09-22 NOTE — BH NOTE
Behavioral Health Interdisciplinary Rounds     Patient Name: Iwona Lucero  Age: 28 y.o. Room/Bed:  236/01  Primary Diagnosis: Severe manic bipolar I disorder with psychotic features ([address] [facility])   Admission Status: Involuntary Commitment     Readmission within 30 days: No  Power of  in place: No  Patient requires a blocked bed: No14}          Reason for blocked bed:     Sleep hours: 6.5       Participation in Care/Groups:  Yes  Medication Compliant?: Yes  PRNS (last 24 hours): None    Restraints (last 24 hours):  No  Substance Abuse:  No    24 hour chart check complete: Yes    Patient goal(s) for today: to get well and be home next week  Treatment team focus/goals:  Will report anxiety at manageable levels  Progress note: continues to show signs of improvement, less paranoid, thoughts are not racing as much and more on track    LOS:  3  Expected LOS: 4-5    Financial concerns/prescription coverage:  No  Family contact: no      Family requesting physician contact today:  No  Discharge plan: home  Access to weapons: No       Outpatient provider(s): [facility]  Patient's preferred phone number for follow up call: [phone]     Participating treatment team members: Monica Lion, Dr. Osei Dumont John, Elena Lee

## 2023-09-23 PROCEDURE — 1240000000 HC EMOTIONAL WELLNESS R&B

## 2023-09-23 PROCEDURE — 6370000000 HC RX 637 (ALT 250 FOR IP): Performed by: PSYCHIATRY & NEUROLOGY

## 2023-09-23 RX ADMIN — QUETIAPINE FUMARATE 600 MG: 200 TABLET ORAL at 20:42

## 2023-09-23 RX ADMIN — LEVOTHYROXINE SODIUM 25 MCG: 0.05 TABLET ORAL at 06:11

## 2023-09-23 ASSESSMENT — PAIN SCALES - GENERAL
PAINLEVEL_OUTOF10: 0
PAINLEVEL_OUTOF10: 0

## 2023-09-23 NOTE — PLAN OF CARE
Problem: Risk for Elopement  Goal: Patient will not exit the unit/facility without proper excort  Outcome: Progressing     Problem: Anxiety  Goal: Will report anxiety at manageable levels  Description: INTERVENTIONS:  1. Administer medication as ordered  2. Teach and rehearse alternative coping skills  3.  Provide emotional support with 1:1 interaction with staff  Outcome: Progressing  Flowsheets (Taken 9/23/2023 0800 by Sung Arcos RN)  Will report anxiety at manageable levels: Administer medication as ordered     Problem: Pain  Goal: Verbalizes/displays adequate comfort level or baseline comfort level  Outcome: Progressing

## 2023-09-23 NOTE — PLAN OF CARE
Problem: Anxiety  Goal: Will report anxiety at manageable levels  Description: INTERVENTIONS:  1. Administer medication as ordered  2. Teach and rehearse alternative coping skills  3.  Provide emotional support with 1:1 interaction with staff  9/22/2023 2102 by Tia Scott RN  Outcome: Progressing  9/22/2023 1414 by Ramiro Mondragon RN  Outcome: Progressing  Flowsheets (Taken 9/22/2023 0815)  Will report anxiety at manageable levels:   Provide emotional support with 1:1 interaction with staff   Administer medication as ordered     Problem: Pain  Goal: Verbalizes/displays adequate comfort level or baseline comfort level  9/22/2023 2102 by Tia Scott RN  Outcome: Progressing  9/22/2023 1414 by Ramiro Mondragon RN  Outcome: Progressing  Flowsheets (Taken 9/22/2023 0816)  Verbalizes/displays adequate comfort level or baseline comfort level: Encourage patient to monitor pain and request assistance

## 2023-09-23 NOTE — BH NOTE
Mood is calm, pleasant. She continues to display psychotic features, distracted, and paranoid. She denies pain, SI/HI/AVH when asked. No s/s of distress noted. She takes her medications, and is compliant with staff interventions. She consumed snack this tour. No unsafe behaviors at this time. Will continue to monitor for safety, and seizure precautions.      Asleep 7.30 hours this tour

## 2023-09-23 NOTE — PLAN OF CARE
Problem: Respiratory - Adult  Goal: Able to breathe comfortably  Description: Able to breathe comfortably  Outcome: Progressing     Problem: Cardiovascular - Adult  Goal: Oriented to person, place, and time  Outcome: Progressing     Problem: Discharge Planning  Goal: Discharge to home or other facility with appropriate resources  Recent Flowsheet Documentation  Taken 9/23/2023 0800 by Sara Peterson RN  Discharge to home or other facility with appropriate resources: Identify barriers to discharge with patient and caregiver     Problem: Anxiety  Goal: Will report anxiety at manageable levels  Description: INTERVENTIONS:  1. Administer medication as ordered  2. Teach and rehearse alternative coping skills  3.  Provide emotional support with 1:1 interaction with staff  Recent Flowsheet Documentation  Taken 9/23/2023 0800 by Sara Peterson RN  Will report anxiety at manageable levels: Administer medication as ordered

## 2023-09-23 NOTE — BH NOTE
Affect labile; irritable. Mood labile. Cooperative. Does not initiate conversation and does not like to be questioned by staff. Knows how to ask for what she needs. Showered and changed clothes. Denies SI/HI/AVH/pain. Isolative to room to sleep in between meals. Ate well. Came out for snack. Did not make phone calls and did not want to receive any today. Pacing at times in room.

## 2023-09-24 PROCEDURE — 6370000000 HC RX 637 (ALT 250 FOR IP): Performed by: PSYCHIATRY & NEUROLOGY

## 2023-09-24 PROCEDURE — 1240000000 HC EMOTIONAL WELLNESS R&B

## 2023-09-24 RX ADMIN — LEVOTHYROXINE SODIUM 25 MCG: 0.05 TABLET ORAL at 06:08

## 2023-09-24 RX ADMIN — QUETIAPINE FUMARATE 600 MG: 200 TABLET ORAL at 22:09

## 2023-09-24 ASSESSMENT — PAIN SCALES - GENERAL
PAINLEVEL_OUTOF10: 0
PAINLEVEL_OUTOF10: 0

## 2023-09-24 NOTE — PLAN OF CARE
Problem: Respiratory - Adult  Goal: Able to breathe comfortably  Description: Able to breathe comfortably  Outcome: Progressing     Problem: Discharge Planning  Goal: Discharge to home or other facility with appropriate resources  Recent Flowsheet Documentation  Taken 9/24/2023 0800 by Reji Mae RN  Discharge to home or other facility with appropriate resources: Identify barriers to discharge with patient and caregiver     Problem: Anxiety  Goal: Will report anxiety at manageable levels  Description: INTERVENTIONS:  1. Administer medication as ordered  2. Teach and rehearse alternative coping skills  3.  Provide emotional support with 1:1 interaction with staff  Recent Flowsheet Documentation  Taken 9/24/2023 0800 by Reji Mae RN  Will report anxiety at manageable levels: Administer medication as ordered     Problem: Pain  Goal: Verbalizes/displays adequate comfort level or baseline comfort level  Recent Flowsheet Documentation  Taken 9/24/2023 0747 by Reji Mae RN  Verbalizes/displays adequate comfort level or baseline comfort level: Encourage patient to monitor pain and request assistance

## 2023-09-24 NOTE — BH NOTE
Mood is labile, quiet. She denies SI/HI/AVH when asked. Denies pain. No distress noted. She consumed snack, sat in the day room, watched a movie, and worked on a puzzle. She is compliant with medication regiment, and staff interventions. No unsafe behaviors at this time. Asleep 6.45 hours this tour.

## 2023-09-24 NOTE — BH NOTE
Affect labile. Mood labile. Cooperative. Mute most of time but does know how to ask for what she needs. Showered and changed clothes. Denies SI/HI/AVH/pain. Worked on puzzle and attended beauty self esteem group as passive participant. Ate well.

## 2023-09-25 PROCEDURE — 6370000000 HC RX 637 (ALT 250 FOR IP): Performed by: PSYCHIATRY & NEUROLOGY

## 2023-09-25 PROCEDURE — 1240000000 HC EMOTIONAL WELLNESS R&B

## 2023-09-25 PROCEDURE — 99232 SBSQ HOSP IP/OBS MODERATE 35: CPT | Performed by: PSYCHIATRY & NEUROLOGY

## 2023-09-25 RX ADMIN — QUETIAPINE FUMARATE 600 MG: 200 TABLET ORAL at 20:36

## 2023-09-25 RX ADMIN — LEVOTHYROXINE SODIUM 25 MCG: 0.05 TABLET ORAL at 06:11

## 2023-09-25 RX ADMIN — TRAZODONE HYDROCHLORIDE 50 MG: 50 TABLET ORAL at 20:36

## 2023-09-25 ASSESSMENT — PAIN SCALES - GENERAL: PAINLEVEL_OUTOF10: 0

## 2023-09-25 NOTE — BH NOTE
Affect flat/restricted, mood labile. Patient alert and oriented x 4. Denies pain. No SI/HI/AVH. Patient remains paranoid and suspicious with guarded  Patient cooperative and pleasant. No agitation or aggression noted. No scheduled medication ordered or PRN's administered. Patient appetite good. Patient attended and engaged in groups with prompting. Patient sitting in the day most of the shift sitting quietly putting a puzzle together. Patient makes needs known to staff. Patient in no apparent distress. Patient pulse rangers manually between 100-103 but all other vitals signs within normal limits. Dr. Michael Donis aware and staff will continue to monitor. Patient declined all PRN medications offered for anxiety. C-SSRS score rated low. Treatment plans up to date. Patient will be discharged home on Wednesday 09/27/23 if continues to be stable. No falls noted on this shift.

## 2023-09-25 NOTE — PLAN OF CARE
Problem: Cardiovascular - Adult  Goal: Oriented to person, place, and time  Outcome: Progressing     Problem: Musculoskeletal - Adult  Goal: Absence of physical injury  Description: Absence of physical injury  Outcome: Progressing     Problem: Safety - Adult  Goal: Free from fall injury  Outcome: Progressing     Problem: Pain  Goal: Verbalizes/displays adequate comfort level or baseline comfort level  Outcome: Progressing

## 2023-09-25 NOTE — BH NOTE
Affect blunt, mood labile. Patient a & o x 4. Denies pain. No SI, HI, AVH. Patient enjoyed playing with puzzle in the dayroom. Ate snacks in the dayroom. Isolative. Non-verbal towards peers. Patient tolerated medications well, cooperative. Currently sleeping during the time of this note.      Slept 6.15h

## 2023-09-25 NOTE — GROUP NOTE
Group Therapy Note    Date: 9/25/2023    Group Start Time: 6974  Group End Time: 5618  Group Topic: Community Meeting    [address] [facility]        Group Therapy Note    Attendees: 3       Patient's Goal:  go home soon    Notes:  Tracey Delcid was out for the morning group this morning. She stated the she is having a good day, ate okay, and slept good last night. She is having no anxiety nor depression. Something good day happened was told she would be going home on Wednesday, but nothing bad has happened so far today. She is having no thoughts of hurting herself nor anyone else and no pain.     Status After Intervention:  Unchanged    Participation Level: Minimal    Participation Quality: Appropriate      Speech:  normal      Thought Process/Content: Perseverating      Affective Functioning: Flat      Mood:  calm      Level of consciousness:  Alert      Response to Learning: Able to verbalize current knowledge/experience and Able to verbalize/acknowledge new learning      Endings: None Reported    Modes of Intervention: Support      Discipline Responsible: Behavorial Health Tech      Signature:  Nahed Sinha CNA

## 2023-09-25 NOTE — BH NOTE
Patient reports feeling good and not having any distressing symptoms. Her thoughts continue to become more organized. Her area in her room was tidy with her bed made and she is doing her ADL care. She is compliant with treatment. Spoke with her  [address] from [facility]. She wants patient to call the [facility] on October 2 that is her scheduled refill appointment and they will determine what she needs refilled and schedule future appointments. Her  wants to see her in the office on Friday September 29 and they will also discussed skilled building services.

## 2023-09-25 NOTE — PLAN OF CARE
Problem: Risk for Elopement  Goal: Patient will not exit the unit/facility without proper excort  Outcome: Progressing     Problem: Respiratory - Adult  Goal: Able to breathe comfortably  Description: Able to breathe comfortably  9/24/2023 2107 by Radha Ziegler RN  Outcome: Progressing     Problem: Cardiovascular - Adult  Goal: Oriented to person, place, and time  Outcome: Progressing     Problem: Musculoskeletal - Adult  Goal: Absence of physical injury  Description: Absence of physical injury  Outcome: Progressing

## 2023-09-25 NOTE — BH NOTE
Behavioral Health Interdisciplinary Rounds     Patient Name: Lizbet Lyles  Age: 28 y.o.   Room/Bed:  236/01  Primary Diagnosis: Severe manic bipolar I disorder with psychotic features ([address] [facility])   Admission Status: Involuntary Commitment     Readmission within 30 days: No  Power of  in place: No  Patient requires a blocked bed: Yes14}          Reason for blocked bed:     Sleep hours: 6.25       Participation in Care/Groups:  Yes  Medication Compliant?: Yes  PRNS (last 24 hours): None    Restraints (last 24 hours):  No  Substance Abuse:  No    24 hour chart check complete: Yes    Patient goal(s) for today: to be discharged soon  Treatment team focus/goals: Discharge to home or other facility with appropriate resources  Progress note: patient continues to improve and discharge is scheduled for Wednesday she is connected to [facility]    LOS:  6  Expected LOS: 2    Financial concerns/prescription coverage:  No  Family contact: no      Family requesting physician contact today:  No  Discharge plan: home  Access to weapons: No       Outpatient provider(s): [facility]  Patient's preferred phone number for follow up call: [phone]    Participating treatment team members: Lizbet Lyles, Anthony Hinkle, Dr. Jay Jay TINSLEY, Awais Jarrell

## 2023-09-25 NOTE — PROGRESS NOTES
INTERVAL Hx:  Records and clinical information from the weekend were reviewed. States she's feeling \"good\" presently, and that she's not having any distressing thoughts or symptoms now. However, she's still slightly scattered cognitively, but not nearly as psychotic as she had been. Appears to be much less hypomanic and pressured now, and her thoughts have been more organized and linear, but she can still derail easily, although not into any delusional thinking. Very little emotional instability now, and she seems to be less paranoid now (but she's still guarded and suspicious at times). Tolerating the Seroquel as well--has ome tiredness in the AM, but it's tolerable and she states she's very comfortable with this med at this dosage. No further falls or motor impairment as well. Pulse has been less elevated--88--106 over the past 72 hours, but it spiked to 131 this AM. No other medical symptoms or VS abnormalities. Also no med SE's except for some AM tiredness--no orthostasis, EPSE's, etc. Discussed Tx and D/C plans further. Slept 6.25 hours last night.      MEDS:  Current Facility-Administered Medications   Medication Dose Route Frequency    QUEtiapine (SEROQUEL) tablet 600 mg  600 mg Oral QHS    acetaminophen (TYLENOL) tablet 650 mg  650 mg Oral Q4H PRN    polyethylene glycol (GLYCOLAX) packet 17 g  17 g Oral Daily PRN    ibuprofen (ADVIL;MOTRIN) tablet 400 mg  400 mg Oral Q6H PRN    traZODone (DESYREL) tablet 50 mg  50 mg Oral Nightly PRN    hydrOXYzine HCl (ATARAX) tablet 50 mg  50 mg Oral TID PRN    aluminum & magnesium hydroxide-simethicone (MAALOX) 200-200-20 MG/5ML suspension 30 mL  30 mL Oral Q6H PRN    levothyroxine (SYNTHROID) tablet 25 mcg  25 mcg Oral QAM AC    albuterol sulfate HFA (PROVENTIL;VENTOLIN;PROAIR) 108 (90 Base) MCG/ACT inhaler 2 puff  2 puff Inhalation Q4H PRN    QUEtiapine (SEROQUEL) tablet 100 mg  100 mg Oral Q6H PRN    LORazepam (ATIVAN) injection 2 mg  2 mg IntraVENous Q6H PRN

## 2023-09-26 PROCEDURE — 6370000000 HC RX 637 (ALT 250 FOR IP): Performed by: PSYCHIATRY & NEUROLOGY

## 2023-09-26 PROCEDURE — 1240000000 HC EMOTIONAL WELLNESS R&B

## 2023-09-26 PROCEDURE — 93005 ELECTROCARDIOGRAM TRACING: CPT | Performed by: PSYCHIATRY & NEUROLOGY

## 2023-09-26 RX ADMIN — BENZOCAINE AND MENTHOL 1 LOZENGE: 15; 3.6 LOZENGE ORAL at 12:45

## 2023-09-26 RX ADMIN — BENZOCAINE AND MENTHOL 1 LOZENGE: 15; 3.6 LOZENGE ORAL at 10:20

## 2023-09-26 RX ADMIN — BENZOCAINE AND MENTHOL 1 LOZENGE: 15; 3.6 LOZENGE ORAL at 20:54

## 2023-09-26 RX ADMIN — QUETIAPINE FUMARATE 600 MG: 200 TABLET ORAL at 20:54

## 2023-09-26 RX ADMIN — TRAZODONE HYDROCHLORIDE 50 MG: 50 TABLET ORAL at 20:54

## 2023-09-26 RX ADMIN — LEVOTHYROXINE SODIUM 25 MCG: 0.05 TABLET ORAL at 06:12

## 2023-09-26 RX ADMIN — ACETAMINOPHEN 650 MG: 325 TABLET ORAL at 15:06

## 2023-09-26 ASSESSMENT — PAIN DESCRIPTION - LOCATION
LOCATION: THROAT
LOCATION: EAR
LOCATION: THROAT

## 2023-09-26 ASSESSMENT — PAIN DESCRIPTION - DESCRIPTORS
DESCRIPTORS: SORE
DESCRIPTORS: ACHING
DESCRIPTORS: SORE

## 2023-09-26 ASSESSMENT — PAIN SCALES - GENERAL
PAINLEVEL_OUTOF10: 2
PAINLEVEL_OUTOF10: 0
PAINLEVEL_OUTOF10: 2
PAINLEVEL_OUTOF10: 0
PAINLEVEL_OUTOF10: 3
PAINLEVEL_OUTOF10: 0

## 2023-09-26 ASSESSMENT — PAIN DESCRIPTION - ORIENTATION: ORIENTATION: RIGHT

## 2023-09-26 NOTE — GROUP NOTE
Group Therapy Note    Date: 9/26/2023    Group Start Time: 1430  Group End Time: 1500  Group Topic: Community Meeting    [address] [facility]        Group Therapy Note    Attendees: 2       Patient's Goal:  finish this puzzle before leaving tomorrow    Notes:  Tracey Delcid was out for the Group meeting today. She stated that she is having a fine day, ate good, and slept good last night. She is having no anxiety nor depression. Something good that happened is leaving tomorrow and nothing bad has happened so far today. She is having no thoughts of hurting herself nor anyone else but having right ear pain of a 4. Nurse notified. Status After Intervention:  Improved    Participation Level:  Active Listener    Participation Quality: Supportive      Speech:  normal      Thought Process/Content: Logical  Perseverating      Affective Functioning: Congruent      Mood:  calm      Level of consciousness:  Alert      Response to Learning: Able to verbalize current knowledge/experience and Able to retain information      Endings: None Reported    Modes of Intervention: Support      Discipline Responsible: Behavorial Health Tech      Signature:  Nahed Sinha CNA

## 2023-09-26 NOTE — BH NOTE
Affect restricted, mood labile. Alert and Oriented X 4. Cooperative and guarded. Interacted with staff when prompted. Makes needs known. Ate snacks. Denied SI/HI/AVH and pain. Medication compliant. Stable with no s/s of distress.    Laid in bed with eyes closed for 7 hours      PRN Medication Documentation     Specific patient behavior that led to need for PRN medication:  Sleeping  Staff interventions attempted prior to PRN being given: deep breathing   PRN medication given: Trazodone 50mg at 2036  Patient response/effectiveness of PRN medication: pt reported effective

## 2023-09-26 NOTE — PLAN OF CARE
Affect flat/restricted, mood labile. Patient alert and oriented x 4. Denies pain. No SI/HI/AVH. Patient remains paranoid and suspicious. Patient cooperative and pleasant. No agitation or aggression noted. Medication compliant and PRN's administered. Patient appetite good and eats 100% of all meals plus snacks. Patient in no apparent distress. Patient Pulse rate ranges between 104-110. Dr. Keisha Healy made aware and ordered EKG (results Sinus tachycardia otherwise normal ECG) and all other vitals signs within normal limits. Patient sitting in dayroom most of the shift putting a puzzle together. C-SSRS score rated low. Treatment plans up to date. No falls noted on this shift. Patient will be discharged home tomorrow if she continues to be stable. PRN Medication Documentation    Specific patient behavior that led to need for PRN medication: Patient c/o having a sore throat. Staff interventions attempted prior to PRN being given: Assessment done. PRN medication given: Benzocaine-menthol 1 lozenge po at 1020. Patient response/effectiveness of PRN medication: Positive effects. PRN Medication Documentation    Specific patient behavior that led to need for PRN medication: Patient c/o having a sore throat. Staff interventions attempted prior to PRN being given: Assessment done. PRN medication given: Benzocaine-menthol 1 lozenge at 1247. Patient response/effectiveness of PRN medication: Positive effects. PRN Medication Documentation    Specific patient behavior that led to need for PRN medication: Patient c/o having left ear ache pain 4/10. Staff interventions attempted prior to PRN being given: Assessment done. PRN medication given: Tylenol 650 mg po at 1506. Patient response/effectiveness of PRN medication:  Positive effects.

## 2023-09-26 NOTE — BH NOTE
Patient will be discharged to her apartment tomorrow. Discussed with her the follow up that was set up. She will try to coordinate a ride through [facility] today so she can get in to see her CM. Patient is appropriate with peers and staff and has been involved in her discharge planning. She has stayed out of her room working on a puzzle. She is doing her ADL care.

## 2023-09-26 NOTE — PLAN OF CARE
Problem: Respiratory - Adult  Goal: Able to breathe comfortably  Description: Able to breathe comfortably  Outcome: Progressing     Problem: Musculoskeletal - Adult  Goal: Absence of physical injury  Description: Absence of physical injury  Outcome: Progressing     Problem: Anxiety  Goal: Will report anxiety at manageable levels  Description: INTERVENTIONS:  1. Administer medication as ordered  2. Teach and rehearse alternative coping skills  3.  Provide emotional support with 1:1 interaction with staff  Outcome: Progressing

## 2023-09-27 VITALS
OXYGEN SATURATION: 100 % | BODY MASS INDEX: 46.6 KG/M2 | HEIGHT: 58 IN | DIASTOLIC BLOOD PRESSURE: 71 MMHG | WEIGHT: 222 LBS | HEART RATE: 122 BPM | RESPIRATION RATE: 18 BRPM | SYSTOLIC BLOOD PRESSURE: 134 MMHG | TEMPERATURE: 98.4 F

## 2023-09-27 PROCEDURE — 6370000000 HC RX 637 (ALT 250 FOR IP): Performed by: PSYCHIATRY & NEUROLOGY

## 2023-09-27 RX ADMIN — LEVOTHYROXINE SODIUM 25 MCG: 0.05 TABLET ORAL at 06:02

## 2023-09-27 ASSESSMENT — PAIN SCALES - GENERAL: PAINLEVEL_OUTOF10: 0

## 2023-09-27 NOTE — BH NOTE
Affect/ mood euthymic . Alert and Oriented X 4. Cooperative. Interacted with staff. Makes needs known. Ate snacks. Denied SI/HI/AVH and pain. Medication compliant. Stable with no s/s of distress.    Laid in bed with eyes closed 7 hours and 15 min       PRN Medication Documentation     Specific patient behavior that led to need for PRN medication:  Sleeping  Staff interventions attempted prior to PRN being given: deep breathing   PRN medication given: Trazodone 50mg at 2054  Patient response/effectiveness of PRN medication pt reported effective           PRN Medication Documentation     Specific patient behavior that led to need for PRN medication: c/o sore throat  Staff interventions attempted prior to PRN being given: assessed   PRN medication given: cepacol sore throat lozenge at 2054  Patient response/effectiveness of PRN medication pt reported effective

## 2023-09-27 NOTE — BH NOTE
Patient will be discharged to home this date transported by [facility] outpatient. She will see her  Callie Pryor on September 29 at 11:30 am. She is scheduled to call in on October 2nd for her medication refill appointment at any time. Patient was involved and agreeable in her discharge plans. [id number] [facility] transition of care was routed to the [facility] and to her PCP. She was also given a copy to take with her.

## 2023-09-27 NOTE — PLAN OF CARE
Problem: Discharge Planning  Goal: Discharge to home or other facility with appropriate resources  Outcome: [facility] 114 Resolved Met  Flowsheets (Taken 9/27/2023 0735)  Discharge to home or other facility with appropriate resources: Identify barriers to discharge with patient and caregiver     Problem: Risk for Elopement  Goal: Patient will not exit the unit/facility without proper excort  9/27/2023 1314 by Abby Armstrong RN  Outcome: [facility] 114 Resolved Met  9/27/2023 0617 by Kaylee Sheehan RN  Outcome: Progressing     Problem: Respiratory - Adult  Goal: Able to breathe comfortably  Description: Able to breathe comfortably  9/27/2023 1314 by Abby Armstrong RN  Outcome: [facility] 114 Resolved Met  9/27/2023 1140 by Abby Armstrong RN  Outcome: Progressing  9/27/2023 0617 by Kaylee Sheehan RN  Outcome: Progressing     Problem: Cardiovascular - Adult  Goal: Oriented to person, place, and time  9/27/2023 1314 by Abby Armstrong RN  Outcome: [facility] 114 Resolved Met  9/27/2023 1140 by Abby Armstrong RN  Outcome: Progressing  9/27/2023 0617 by Kaylee Sheehan RN  Outcome: Progressing     Problem: Musculoskeletal - Adult  Goal: Absence of physical injury  Description: Absence of physical injury  9/27/2023 1314 by Abby Armstrong RN  Outcome: [facility] 114 Resolved Met  9/27/2023 1140 by Abby Armstrong RN  Outcome: Progressing     Problem: Hematologic - Adult  Goal: No signs of physiological stress  Description: No signs of physiological stress  Outcome: [facility] 114 Resolved Met     Problem: Anxiety  Goal: Will report anxiety at manageable levels  Description: INTERVENTIONS:  1. Administer medication as ordered  2. Teach and rehearse alternative coping skills  3.  Provide emotional support with 1:1 interaction with staff  9/27/2023 1314 by Abby Armstrong RN  Outcome: [facility] 114 Resolved Met  9/27/2023 1140 by Abby Armstrong RN  Outcome: Progressing  Flowsheets (Taken 9/27/2023 0735)  Will report anxiety at manageable levels: Provide

## 2023-09-27 NOTE — DISCHARGE INSTR - DIET

## 2023-09-27 NOTE — BH NOTE
Behavioral Health Transition Record to Provider    Patient Name: Mary Puga  YOB: 1990  Medical Record Number: [id number]  Date of Admission: 9/18/2023  Date of Discharge: 9/27/2023    Attending Provider: Heaven Flores MD  Discharging Provider: Karime Lamb  To contact this individual call [phone] and ask the  to page. If unavailable, ask to be transferred to [facility] Provider on call. [facility] Provider will be available on call 24/7 and during holidays. Primary Care Provider: Livia Delcid MD    Allergies   Allergen Reactions    Other Rash     Grapes, cherries, blueberries    Beef Allergy Itching    Pork Allergy Nausea And Vomiting       Reason for Admission: The patient is a 68-year-old single female who has a well-documented past psychiatric history of bipolar disorder, and who was admitted through the [facility] emergency room on a TDO due to escalation of ricci and psychosis. The patient actually called the police herself requesting to be brought to the emergency room because she had been out of medication for roughly 2 weeks. She [place] arranging [facility] [employer] to [place] to go to the pharmacy, and consequently she was not able to get her Seroquel refilled. Not surprisingly, she became quite manic and psychotic again, and this is consistent with prior hospitalizations that she has had before (being off medicine for short period of time and quickly becoming manic and psychotic). She is very pressured, slightly agitated, but not aggressive, hypertalkative, and her thoughts are very loose, tangential, and she is very easily distracted. Her self-care has been poor. Her insight and judgment are obviously impaired as well, yet she recognized it at some level that she needed help which is what led her to call the police.  She has also developed some symptoms of psychosis, specifically some delusions, one of which is that her mother

## 2023-09-27 NOTE — BH NOTE
Patient discharged home today at [phone] accompained to the front of hospital by [place]. Alert and oriented x 4. Denies pain. No SI/HI/AVH. No delusional or paranoid statements made. Patient cooperative and pleasant. Medication compliant. Appetite good. Ate 100% of breakfast/snack/lunch. Patient in no apparent distress. Vitals signs within normal limits. RN reviewed discharge instructions and orders, which included medications, drug name, purpose, dosage, administration time , route and adverse effects. Patient verbalized understanding and written copies provided of discharge orders and instructions. Also provided with suicidal hotline and crisis stabilization line. Patient left the unit accompanied by staff. All personal belongings and valuables sent with patient.

## 2023-09-27 NOTE — PLAN OF CARE
Problem: Discharge Planning  Goal: Discharge to home or other facility with appropriate resources  Outcome: Progressing  Flowsheets (Taken 9/26/2023 1930)  Discharge to home or other facility with appropriate resources: Identify barriers to discharge with patient and caregiver     Problem: Risk for Elopement  Goal: Patient will not exit the unit/facility without proper excort  Outcome: Progressing     Problem: Respiratory - Adult  Goal: Able to breathe comfortably  Description: Able to breathe comfortably  9/26/2023 1107 by Mikaela Best RN  Outcome: Progressing     Problem: Musculoskeletal - Adult  Goal: Absence of physical injury  Description: Absence of physical injury  9/26/2023 1107 by Mikaela Best RN  Outcome: Progressing     Problem: Anxiety  Goal: Will report anxiety at manageable levels  Description: INTERVENTIONS:  1. Administer medication as ordered  2. Teach and rehearse alternative coping skills  3.  Provide emotional support with 1:1 interaction with staff  Recent Flowsheet Documentation  Taken 9/26/2023 1930 by Fernanda Alcala RN  Will report anxiety at manageable levels:   Provide emotional support with 1:1 interaction with staff   Administer medication as ordered  9/26/2023 1107 by Mikaela Best RN  Outcome: Progressing

## 2023-09-27 NOTE — PLAN OF CARE
Problem: Discharge Planning  Goal: Discharge to home or other facility with appropriate resources  Outcome: Progressing  Flowsheets (Taken 9/26/2023 1930)  Discharge to home or other facility with appropriate resources: Identify barriers to discharge with patient and caregiver     Problem: Risk for Elopement  Goal: Patient will not exit the unit/facility without proper excort  9/27/2023 0617 by Doris Mcmahon RN  Outcome: Progressing  9/26/2023 2221 by Doris Mcmahon RN  Outcome: Progressing     Problem: Respiratory - Adult  Goal: Able to breathe comfortably  Description: Able to breathe comfortably  Outcome: Progressing     Problem: Cardiovascular - Adult  Goal: Oriented to person, place, and time  Outcome: Progressing

## 2023-09-27 NOTE — PLAN OF CARE
Problem: Discharge Planning  Goal: Discharge to home or other facility with appropriate resources  Recent Flowsheet Documentation  Taken 9/27/2023 0735 by Yareli Pinzon RN  Discharge to home or other facility with appropriate resources: Identify barriers to discharge with patient and caregiver  9/26/2023 2221 by Wilbert Crisostomo RN  Outcome: Progressing  Flowsheets (Taken 9/26/2023 1930)  Discharge to home or other facility with appropriate resources: Identify barriers to discharge with patient and caregiver     Problem: Risk for Elopement  Goal: Patient will not exit the unit/facility without proper excort  9/27/2023 0617 by Wilbert Crisostomo RN  Outcome: Progressing  9/26/2023 2221 by Wilbert Crisostomo RN  Outcome: Progressing     Problem: Respiratory - Adult  Goal: Able to breathe comfortably  Description: Able to breathe comfortably  9/27/2023 1140 by Yareli Pinzon RN  Outcome: Progressing  9/27/2023 0617 by Wilbert Crisostomo RN  Outcome: Progressing     Problem: Cardiovascular - Adult  Goal: Oriented to person, place, and time  9/27/2023 1140 by Yareli Pinzon RN  Outcome: Progressing  9/27/2023 0617 by Wilbert Crisostomo RN  Outcome: Progressing     Problem: Musculoskeletal - Adult  Goal: Absence of physical injury  Description: Absence of physical injury  Outcome: Progressing     Problem: Anxiety  Goal: Will report anxiety at manageable levels  Description: INTERVENTIONS:  1. Administer medication as ordered  2. Teach and rehearse alternative coping skills  3. Provide emotional support with 1:1 interaction with staff  Outcome: Progressing  Flowsheets (Taken 9/27/2023 0735)  Will report anxiety at manageable levels: Provide emotional support with 1:1 interaction with staff     Problem: Involuntary Admit  Goal: Will cooperate with staff recommendations and doctor's orders and will demonstrate appropriate behavior  Description: INTERVENTIONS:  1.  Treat underlying conditions and offer medication as

## 2023-09-27 NOTE — DISCHARGE INSTR - ACTIVITY
BEHAVIORAL HEALTH NURSING DISCHARGE NOTE      PATIENT INSTRUCTIONS:    What to do at [address] may resume normal activities. Avoid making any critical decisions for at least 24-hours. Recommended diet: regular diet    Recommended activity: activity as tolerated        Follow-up with  Tonja Erickson on 9/29/23 at 11:30a. If you have problems relating to your recovery, call your physician. The discharge information has been reviewed with the patient. The patient verbalized understanding.

## 2023-09-27 NOTE — DISCHARGE SUMMARY
[facility] DISCHARGE    Name:  Jose Mendoza  MR#:  [id number]  :  1990  ACCOUNT #:  [de-identified]  ADMIT DATE:  2023  DISCHARGE DATE:  2023    NOTE:  Greater than 35 minutes was spent in preparation of this discharge. DISCHARGE DIAGNOSES:  AXIS I:  Bipolar disorder, most recently manic, with psychosis (F31.2). AXIS II:  Deferred. AXIS III:  Obesity; hyperlipidemia; hypothyroidism; history of asthma. AXIS IV:  Stressors are moderate (limited support system). AXIS V:  Global Assessment of Functioning at discharge is 65-70. DISCHARGE MEDICATIONS:  1. Seroquel 600 mg at bedtime. 2.  Albuterol inhaler two puffs q.6 h. p.r.n.  3.  Synthroid 25 mcg daily. 4.  Melatonin 10 mg at bedtime p.r.n. She was not given any prescriptions as she has suitable refills available for each of these medicines. NOTE:  The Wellbutrin XL was discontinued. DISPOSITION/FOLLOWUP PLANS:  The patient is being discharged in stable condition later this morning on 2023. She will be returning home where she lives independently, and following up with the [facility], seeing her  on  and having a medication check on 10/02/2023. HOSPITAL COURSE:  As noted in the admission note, the patient is a 27-year-old single female who has a well-documented past psychiatric history of bipolar disorder and who was admitted through the [facility] emergency room on a TDO due to escalating symptoms of ricci and psychosis. She had actually called the police herself requesting to be brought to the emergency room because she had been out of medicine for roughly 2 weeks. She had become quite manic in that time, to the point where she was very pressured, hyperactive, hypertalkative, with flight of ideas, and a number of delusional-type thoughts. One of those was that her mother may be an imposter named \"Raul,\" and many of her delusions were of a paranoid nature.

## 2023-09-27 NOTE — BH NOTE
Patient alert and oriented x4. Flat affect. Speech clear. Denies pain or wanting to harm self/others. Denies SI/HI/AVH. Good appetite. Ate 100% of breakfast. Out of room for group. Can be seen coloring or doing crossword puzzles during quiet time. Vitals stable. Medication compliant.

## 2023-09-29 LAB
EKG ATRIAL RATE: 110 BPM
EKG DIAGNOSIS: NORMAL
EKG P AXIS: 52 DEGREES
EKG P-R INTERVAL: 138 MS
EKG Q-T INTERVAL: 312 MS
EKG QRS DURATION: 66 MS
EKG QTC CALCULATION (BAZETT): 422 MS
EKG R AXIS: 46 DEGREES
EKG T AXIS: 30 DEGREES
EKG VENTRICULAR RATE: 110 BPM